# Patient Record
Sex: FEMALE | Race: WHITE | NOT HISPANIC OR LATINO | Employment: UNEMPLOYED | URBAN - METROPOLITAN AREA
[De-identification: names, ages, dates, MRNs, and addresses within clinical notes are randomized per-mention and may not be internally consistent; named-entity substitution may affect disease eponyms.]

---

## 2019-07-08 ENCOUNTER — OFFICE VISIT (OUTPATIENT)
Dept: OBSTETRICS AND GYNECOLOGY | Facility: CLINIC | Age: 49
End: 2019-07-08

## 2019-07-08 ENCOUNTER — LAB (OUTPATIENT)
Dept: LAB | Facility: HOSPITAL | Age: 49
End: 2019-07-08

## 2019-07-08 VITALS
DIASTOLIC BLOOD PRESSURE: 80 MMHG | SYSTOLIC BLOOD PRESSURE: 124 MMHG | HEIGHT: 67 IN | BODY MASS INDEX: 24.01 KG/M2 | WEIGHT: 153 LBS

## 2019-07-08 DIAGNOSIS — N91.1 SECONDARY AMENORRHEA: ICD-10-CM

## 2019-07-08 DIAGNOSIS — Z01.419 ENCOUNTER FOR GYNECOLOGICAL EXAMINATION WITHOUT ABNORMAL FINDING: Primary | ICD-10-CM

## 2019-07-08 LAB — FSH SERPL-ACNC: 134 MIU/ML

## 2019-07-08 PROCEDURE — 36415 COLL VENOUS BLD VENIPUNCTURE: CPT

## 2019-07-08 PROCEDURE — 83001 ASSAY OF GONADOTROPIN (FSH): CPT

## 2019-07-08 PROCEDURE — 99386 PREV VISIT NEW AGE 40-64: CPT | Performed by: OBSTETRICS & GYNECOLOGY

## 2019-07-08 NOTE — PROGRESS NOTES
Chief Complaint   Patient presents with   • Gynecologic Exam     annual exam   • Yue Dinh is a 48 y.o. year old  presenting to be seen for her annual exam.  This patient has been absent from our care for over 3 years.  She has a prior history of a hysteroscopy/laparoscopy for endometriosis.  She has had 3  sections and tubal sterilization.  She has had 3 D&Cs; 2 for incomplete AB's and one for retained products of conception.  I did a hysteroscopy NovaSure endometrial ablation and she has had amenorrhea since that time.  She has some vaginal dryness and difficulty sleeping.  She has symptoms of overactive bladder but does not desire treatment.  She denies bowel symptoms.    SCREENING TESTS  No recent Pap tests or mammograms  Year 2012 2016 2017   Age                         PAP                         HPV high risk                         Mammogram                         GERALD score                         Breast MRI                         Lipids                         Vitamin D                         Colonoscopy                         DEXA  Frax (hip/any)                         Ovarian Screen                             She exercises regularly: yes.  She wears her seat belt: yes.  She has concerns about domestic violence: no.  She has noticed changes in height: no    GYN screening history: None recently  .    No Additional Complaints Reported    The following portions of the patient's history were reviewed and updated as appropriate:vital signs and   She  has a past medical history of OAB (overactive bladder).  She does not have any pertinent problems on file.  She  has a past surgical history that includes Dilation and curettage of uterus; Endometrial ablation; Hysterectomy; Tubal ligation; Hysteroscopy;  section; d&c with suction; and Pelvic laparoscopy.  Her family  "history includes Diabetes in her other; Heart disease in her other; Hypertension in her other; Skin cancer in her other.  She  reports that she has never smoked. She has never used smokeless tobacco. She reports that she does not drink alcohol or use drugs.  Current Outpatient Medications   Medication Sig Dispense Refill   • Cholecalciferol (VITAMIN D PO) Take  by mouth.     • ergocalciferol (ERGOCALCIFEROL) 15971 UNITS capsule Take 1 capsule by mouth 1 (one) time per week.     • vitamin D (ERGOCALCIFEROL) 58542 UNITS capsule capsule Take 1 capsule by mouth 1 (one) time per week.       No current facility-administered medications for this visit.      She is allergic to sulfa antibiotics..    Review of Systems  A comprehensive review of systems was taken.  Constitutional: negative for fever, chills, activity change, appetite change, fatigue and unexpected weight change.  Respiratory: negative  Cardiovascular: negative  Gastrointestinal: negative  Genitourinary:urinary frequency  Musculoskeletal:negative  Behavioral/Psych: negative       /80   Ht 170.2 cm (67\")   Wt 69.4 kg (153 lb)   LMP  (LMP Unknown)   Breastfeeding? No   BMI 23.96 kg/m²     Physical Exam    General:  alert; cooperative; well developed; well nourished   Skin:  No suspicious lesions seen   Thyroid: solitary nodule of right lobe of thyroid   Lungs:  clear to auscultation bilaterally   Heart:  regular rate and rhythm, S1, S2 normal, no murmur, click, rub or gallop   Breasts:  Examined in supine position  Symmetric without masses or skin dimpling  Nipples normal without inversion, lesions or discharge  There are no palpable axillary nodes   Abdomen: soft, non-tender; no masses  no umbilical or inguinal hernias are present  no hepato-splenomegaly   Pelvis: Clinical staff was present for exam  External genitalia:  normal appearance of the external genitalia including Bartholin's and [place]'s glands.  Vaginal:  atrophic mucosal changes are " present;  Cervix:  normal appearance.  Uterus:  normal size, shape and consistency. anteverted;  Adnexa:  non palpable bilaterally.  Rectal:  anus visually normal appearing. recto-vaginal exam unremarkable and confirms findings;     Lab Review   No data reviewed    Imaging  No data reviewed         ASSESSMENT  Problems Addressed this Visit     None      Visit Diagnoses     Encounter for gynecological examination without abnormal finding    -  Primary    Relevant Orders    Liquid-based Pap Smear, Screening    Secondary amenorrhea        Relevant Orders    Follicle Stimulating Hormone          PLAN    · Medications prescribed this encounter:  No orders of the defined types were placed in this encounter.  · Pap test done  · FSH ordered  · Monthly self breast assessment, breast imaging needs to be done  · If the patient is menopausal she will be started on estrogen/progestin therapy  · Calcium, 600 mg/ Vit. D, 400 IU daily; regular weight-bearing exercise  · Follow up: 12 month(s)  *Please note that portions of this documentation may have been completed with a voice recognition program.  Efforts were made to edit this dictation, but occasional words may have been mistranscribed.       This note was electronically signed.    JOEL Amaya MD  July 8, 2019  3:00 PM

## 2019-07-19 ENCOUNTER — HOSPITAL ENCOUNTER (OUTPATIENT)
Dept: MAMMOGRAPHY | Facility: HOSPITAL | Age: 49
Discharge: HOME | End: 2019-07-19
Admitting: OBSTETRICS & GYNECOLOGY

## 2019-07-19 DIAGNOSIS — Z12.39 SCREENING BREAST EXAMINATION: ICD-10-CM

## 2019-07-19 PROCEDURE — 77067 SCR MAMMO BI INCL CAD: CPT

## 2019-07-19 PROCEDURE — 77063 BREAST TOMOSYNTHESIS BI: CPT

## 2019-07-19 PROCEDURE — 77067 SCR MAMMO BI INCL CAD: CPT | Performed by: RADIOLOGY

## 2019-07-19 PROCEDURE — 77063 BREAST TOMOSYNTHESIS BI: CPT | Performed by: RADIOLOGY

## 2021-01-27 ENCOUNTER — IMMUNIZATION (OUTPATIENT)
Dept: VACCINE CLINIC | Facility: HOSPITAL | Age: 51
End: 2021-01-27

## 2021-01-27 PROCEDURE — 0001A: CPT | Performed by: FAMILY MEDICINE

## 2021-01-27 PROCEDURE — 91300 HC SARSCOV02 VAC 30MCG/0.3ML IM: CPT | Performed by: FAMILY MEDICINE

## 2021-02-17 ENCOUNTER — IMMUNIZATION (OUTPATIENT)
Dept: VACCINE CLINIC | Facility: HOSPITAL | Age: 51
End: 2021-02-17

## 2021-02-17 PROCEDURE — 0002A: CPT | Performed by: INTERNAL MEDICINE

## 2021-02-17 PROCEDURE — 91300 HC SARSCOV02 VAC 30MCG/0.3ML IM: CPT | Performed by: INTERNAL MEDICINE

## 2021-04-21 ENCOUNTER — TRANSCRIBE ORDERS (OUTPATIENT)
Dept: ADMINISTRATIVE | Facility: HOSPITAL | Age: 51
End: 2021-04-21

## 2021-04-21 DIAGNOSIS — E04.9 ENLARGEMENT OF THYROID: Primary | ICD-10-CM

## 2021-04-28 ENCOUNTER — HOSPITAL ENCOUNTER (OUTPATIENT)
Dept: ULTRASOUND IMAGING | Facility: HOSPITAL | Age: 51
Discharge: HOME | End: 2021-04-28
Admitting: INTERNAL MEDICINE

## 2021-04-28 DIAGNOSIS — E04.9 ENLARGEMENT OF THYROID: ICD-10-CM

## 2021-04-28 PROCEDURE — 76536 US EXAM OF HEAD AND NECK: CPT | Performed by: RADIOLOGY

## 2021-04-28 PROCEDURE — 76536 US EXAM OF HEAD AND NECK: CPT

## 2021-11-17 ENCOUNTER — APPOINTMENT (OUTPATIENT)
Dept: GENERAL RADIOLOGY | Facility: HOSPITAL | Age: 51
End: 2021-11-17

## 2021-11-17 ENCOUNTER — HOSPITAL ENCOUNTER (EMERGENCY)
Facility: HOSPITAL | Age: 51
Discharge: HOME | End: 2021-11-17
Attending: EMERGENCY MEDICINE | Admitting: EMERGENCY MEDICINE

## 2021-11-17 VITALS
HEIGHT: 67 IN | HEART RATE: 70 BPM | OXYGEN SATURATION: 100 % | BODY MASS INDEX: 25.11 KG/M2 | SYSTOLIC BLOOD PRESSURE: 124 MMHG | RESPIRATION RATE: 16 BRPM | DIASTOLIC BLOOD PRESSURE: 77 MMHG | WEIGHT: 160 LBS | TEMPERATURE: 97.3 F

## 2021-11-17 DIAGNOSIS — R07.9 CHEST PAIN, UNSPECIFIED TYPE: Primary | ICD-10-CM

## 2021-11-17 LAB
ALBUMIN SERPL-MCNC: 4.97 G/DL (ref 3.5–5.2)
ALBUMIN/GLOB SERPL: 1.8 G/DL
ALP SERPL-CCNC: 106 U/L (ref 39–117)
ALT SERPL W P-5'-P-CCNC: 19 U/L (ref 1–33)
ANION GAP SERPL CALCULATED.3IONS-SCNC: 11 MMOL/L (ref 5–15)
AST SERPL-CCNC: 18 U/L (ref 1–32)
BASOPHILS # BLD AUTO: 0.03 10*3/MM3 (ref 0–0.2)
BASOPHILS NFR BLD AUTO: 0.6 % (ref 0–1.5)
BILIRUB SERPL-MCNC: 0.5 MG/DL (ref 0–1.2)
BILIRUB UR QL STRIP: NEGATIVE
BUN SERPL-MCNC: 13 MG/DL (ref 6–20)
BUN/CREAT SERPL: 13.7 (ref 7–25)
CALCIUM SPEC-SCNC: 9.5 MG/DL (ref 8.6–10.5)
CHLORIDE SERPL-SCNC: 105 MMOL/L (ref 98–107)
CLARITY UR: CLEAR
CO2 SERPL-SCNC: 26 MMOL/L (ref 22–29)
COLOR UR: YELLOW
CREAT SERPL-MCNC: 0.95 MG/DL (ref 0.57–1)
D DIMER PPP FEU-MCNC: <0.27 MCGFEU/ML (ref 0–0.5)
DEPRECATED RDW RBC AUTO: 40.9 FL (ref 37–54)
EOSINOPHIL # BLD AUTO: 0.02 10*3/MM3 (ref 0–0.4)
EOSINOPHIL NFR BLD AUTO: 0.4 % (ref 0.3–6.2)
ERYTHROCYTE [DISTWIDTH] IN BLOOD BY AUTOMATED COUNT: 12.8 % (ref 12.3–15.4)
FLUAV SUBTYP SPEC NAA+PROBE: NOT DETECTED
FLUBV RNA ISLT QL NAA+PROBE: NOT DETECTED
GFR SERPL CREATININE-BSD FRML MDRD: 62 ML/MIN/1.73
GLOBULIN UR ELPH-MCNC: 2.7 GM/DL
GLUCOSE SERPL-MCNC: 117 MG/DL (ref 65–99)
GLUCOSE UR STRIP-MCNC: NEGATIVE MG/DL
HCT VFR BLD AUTO: 47.8 % (ref 34–46.6)
HGB BLD-MCNC: 15.2 G/DL (ref 12–15.9)
HGB UR QL STRIP.AUTO: NEGATIVE
HOLD SPECIMEN: NORMAL
HOLD SPECIMEN: NORMAL
IMM GRANULOCYTES # BLD AUTO: 0.01 10*3/MM3 (ref 0–0.05)
IMM GRANULOCYTES NFR BLD AUTO: 0.2 % (ref 0–0.5)
KETONES UR QL STRIP: NEGATIVE
LEUKOCYTE ESTERASE UR QL STRIP.AUTO: NEGATIVE
LYMPHOCYTES # BLD AUTO: 1.06 10*3/MM3 (ref 0.7–3.1)
LYMPHOCYTES NFR BLD AUTO: 20.1 % (ref 19.6–45.3)
MAGNESIUM SERPL-MCNC: 2.3 MG/DL (ref 1.6–2.6)
MCH RBC QN AUTO: 27.8 PG (ref 26.6–33)
MCHC RBC AUTO-ENTMCNC: 31.8 G/DL (ref 31.5–35.7)
MCV RBC AUTO: 87.4 FL (ref 79–97)
MONOCYTES # BLD AUTO: 0.49 10*3/MM3 (ref 0.1–0.9)
MONOCYTES NFR BLD AUTO: 9.3 % (ref 5–12)
NEUTROPHILS NFR BLD AUTO: 3.67 10*3/MM3 (ref 1.7–7)
NEUTROPHILS NFR BLD AUTO: 69.4 % (ref 42.7–76)
NITRITE UR QL STRIP: NEGATIVE
NRBC BLD AUTO-RTO: 0 /100 WBC (ref 0–0.2)
NT-PROBNP SERPL-MCNC: 41.7 PG/ML (ref 0–900)
PH UR STRIP.AUTO: 6.5 [PH] (ref 5–8)
PLATELET # BLD AUTO: 221 10*3/MM3 (ref 140–450)
PMV BLD AUTO: 9.4 FL (ref 6–12)
POTASSIUM SERPL-SCNC: 3.9 MMOL/L (ref 3.5–5.2)
PROT SERPL-MCNC: 7.7 G/DL (ref 6–8.5)
PROT UR QL STRIP: NEGATIVE
QT INTERVAL: 340 MS
QTC INTERVAL: 415 MS
RBC # BLD AUTO: 5.47 10*6/MM3 (ref 3.77–5.28)
SARS-COV-2 RNA PNL SPEC NAA+PROBE: NOT DETECTED
SODIUM SERPL-SCNC: 142 MMOL/L (ref 136–145)
SP GR UR STRIP: 1.01 (ref 1–1.03)
TROPONIN T SERPL-MCNC: <0.01 NG/ML (ref 0–0.03)
TROPONIN T SERPL-MCNC: <0.01 NG/ML (ref 0–0.03)
TSH SERPL DL<=0.05 MIU/L-ACNC: 2.93 UIU/ML (ref 0.27–4.2)
UROBILINOGEN UR QL STRIP: NORMAL
WBC NRBC COR # BLD: 5.28 10*3/MM3 (ref 3.4–10.8)
WHOLE BLOOD HOLD SPECIMEN: NORMAL
WHOLE BLOOD HOLD SPECIMEN: NORMAL

## 2021-11-17 PROCEDURE — 81003 URINALYSIS AUTO W/O SCOPE: CPT | Performed by: PHYSICIAN ASSISTANT

## 2021-11-17 PROCEDURE — 80053 COMPREHEN METABOLIC PANEL: CPT | Performed by: PHYSICIAN ASSISTANT

## 2021-11-17 PROCEDURE — 71045 X-RAY EXAM CHEST 1 VIEW: CPT | Performed by: RADIOLOGY

## 2021-11-17 PROCEDURE — 84484 ASSAY OF TROPONIN QUANT: CPT | Performed by: PHYSICIAN ASSISTANT

## 2021-11-17 PROCEDURE — 84443 ASSAY THYROID STIM HORMONE: CPT | Performed by: PHYSICIAN ASSISTANT

## 2021-11-17 PROCEDURE — 87636 SARSCOV2 & INF A&B AMP PRB: CPT | Performed by: PHYSICIAN ASSISTANT

## 2021-11-17 PROCEDURE — 93010 ELECTROCARDIOGRAM REPORT: CPT | Performed by: INTERNAL MEDICINE

## 2021-11-17 PROCEDURE — 93005 ELECTROCARDIOGRAM TRACING: CPT | Performed by: PHYSICIAN ASSISTANT

## 2021-11-17 PROCEDURE — 36415 COLL VENOUS BLD VENIPUNCTURE: CPT

## 2021-11-17 PROCEDURE — 99284 EMERGENCY DEPT VISIT MOD MDM: CPT

## 2021-11-17 PROCEDURE — 85379 FIBRIN DEGRADATION QUANT: CPT | Performed by: PHYSICIAN ASSISTANT

## 2021-11-17 PROCEDURE — 71045 X-RAY EXAM CHEST 1 VIEW: CPT

## 2021-11-17 PROCEDURE — 83880 ASSAY OF NATRIURETIC PEPTIDE: CPT | Performed by: PHYSICIAN ASSISTANT

## 2021-11-17 PROCEDURE — 85025 COMPLETE CBC W/AUTO DIFF WBC: CPT | Performed by: PHYSICIAN ASSISTANT

## 2021-11-17 PROCEDURE — 83735 ASSAY OF MAGNESIUM: CPT | Performed by: PHYSICIAN ASSISTANT

## 2021-11-17 RX ORDER — ASPIRIN 81 MG/1
324 TABLET, CHEWABLE ORAL ONCE
Status: COMPLETED | OUTPATIENT
Start: 2021-11-17 | End: 2021-11-17

## 2021-11-17 RX ORDER — SODIUM CHLORIDE 0.9 % (FLUSH) 0.9 %
10 SYRINGE (ML) INJECTION AS NEEDED
Status: DISCONTINUED | OUTPATIENT
Start: 2021-11-17 | End: 2021-11-17 | Stop reason: HOSPADM

## 2021-11-17 RX ADMIN — ASPIRIN 324 MG: 81 TABLET, CHEWABLE ORAL at 13:19

## 2021-11-17 NOTE — ED NOTES
Provided pt with urine cup to attempt urine sample. Pt states she can't provide one at this time, but will let us know when she can.      Irma Hand  11/17/21

## 2021-11-17 NOTE — ED PROVIDER NOTES
Subjective   50 yo female patient presents to the ED with complaints of chest pain. Patient states that she has had mid sternal chest pain since Thursday. She states she was seen by her PCP today and had abnormal EKG in the office and was sent to the ED for evaluation. Pt states that she has no cardiac hx and takes no daily medications. She states that this is a dull ache that has been spontaneous for the past week.  No worsening or alleviating factors.        History provided by:  Patient   used: No        Review of Systems   Constitutional: Negative.    HENT: Negative.    Eyes: Negative.    Respiratory: Negative.    Cardiovascular: Positive for chest pain.   Gastrointestinal: Negative.    Endocrine: Negative.    Genitourinary: Negative.    Musculoskeletal: Negative.    Skin: Negative.    Allergic/Immunologic: Negative.    Neurological: Negative.    Hematological: Negative.    Psychiatric/Behavioral: Negative.    All other systems reviewed and are negative.      Past Medical History:   Diagnosis Date   • OAB (overactive bladder)        Allergies   Allergen Reactions   • Sulfa Antibiotics        Past Surgical History:   Procedure Laterality Date   •  SECTION      x 3   • D&C WITH SUCTION     • DILATATION AND CURETTAGE     • ENDOMETRIAL ABLATION      gynecologic services thermal endometrial ablation   • HYSTEROSCOPY      NovaSure ablation   • PELVIC LAPAROSCOPY     • TUBAL ABDOMINAL LIGATION         Family History   Problem Relation Age of Onset   • Diabetes Other    • Heart disease Other         congenital   • Hypertension Other    • Skin cancer Other    • Breast cancer Neg Hx        Social History     Socioeconomic History   • Marital status:    Tobacco Use   • Smoking status: Never Smoker   • Smokeless tobacco: Never Used   Substance and Sexual Activity   • Alcohol use: No   • Drug use: No   • Sexual activity: Yes     Partners: Male     Birth control/protection: Surgical            Objective   Physical Exam  Vitals and nursing note reviewed.   Constitutional:       Appearance: She is well-developed and normal weight.   HENT:      Head: Normocephalic and atraumatic.   Eyes:      Extraocular Movements: Extraocular movements intact.      Pupils: Pupils are equal, round, and reactive to light.   Cardiovascular:      Rate and Rhythm: Normal rate and regular rhythm.      Heart sounds: Normal heart sounds.   Pulmonary:      Effort: Pulmonary effort is normal.      Breath sounds: Normal breath sounds.   Abdominal:      General: Bowel sounds are normal.      Palpations: Abdomen is soft.   Musculoskeletal:         General: Normal range of motion.      Cervical back: Normal range of motion and neck supple.   Skin:     General: Skin is warm and dry.      Capillary Refill: Capillary refill takes less than 2 seconds.   Neurological:      General: No focal deficit present.      Mental Status: She is alert and oriented to person, place, and time.   Psychiatric:         Mood and Affect: Mood normal.         Behavior: Behavior normal.         Procedures           ED Course  ED Course as of 11/17/21 1626   Wed Nov 17, 2021   1419 XR Chest 1 View  IMPRESSION:    Unremarkable exam. No acute cardiopulmonary findings identified.     This report was finalized on 11/17/2021 2:11 PM by Dr. Kwesi Ayala MD.             Specimen Collected: 11/17/21 14:11 Last Resulted: 11/17/21 14:11         [ML]   1505 ECG 12 Lead  Normal sinus rhythm  Possible Left atrial enlargement  Borderline ECG  When compared with ECG of 17-NOV-2021 12:29,  premature atrial complexes are no longer present  Vent. rate 67 BPM  AK interval 146 ms  QRS duration 72 ms  QT/QTcB 380/401 ms  P-R-T axes 72 37 49 [ES]      ED Course User Index  [ES] Harvey Piedra MD  [ML] Melinda Jorge PA                                         HEART Score (for prediction of 6-week risk of major adverse cardiac event) reviewed and/or  performed as part of the patient evaluation and treatment planning process.  The result associated with this review/performance is: 2       MDM  Number of Diagnoses or Management Options     Amount and/or Complexity of Data Reviewed  Clinical lab tests: reviewed and ordered  Tests in the radiology section of CPT®: reviewed and ordered  Tests in the medicine section of CPT®: reviewed    Risk of Complications, Morbidity, and/or Mortality  Presenting problems: low  Diagnostic procedures: low  Management options: low    Patient Progress  Patient progress: improved      Final diagnoses:   Chest pain, unspecified type       ED Disposition  ED Disposition     ED Disposition Condition Comment    Discharge Stable           Nilda Zamorano MD  [address] [place] [address]  [id number]  [facility] [id number] [phone]    Schedule an appointment as soon as possible for a visit in 1 day           Medication List      No changes were made to your prescriptions during this visit.          Melinda Jorge PA  11/17/21 [id number]

## 2021-11-18 ENCOUNTER — HOSPITAL ENCOUNTER (OUTPATIENT)
Dept: RESPIRATORY THERAPY | Facility: HOSPITAL | Age: 51
Discharge: HOME | End: 2021-11-18
Admitting: INTERNAL MEDICINE

## 2021-11-18 ENCOUNTER — TRANSCRIBE ORDERS (OUTPATIENT)
Dept: ADMINISTRATIVE | Facility: HOSPITAL | Age: 51
End: 2021-11-18

## 2021-11-18 DIAGNOSIS — E04.1 NONTOXIC UNINODULAR GOITER: Primary | ICD-10-CM

## 2021-11-18 DIAGNOSIS — R07.89 OTHER CHEST PAIN: ICD-10-CM

## 2021-11-18 DIAGNOSIS — R07.89 OTHER CHEST PAIN: Primary | ICD-10-CM

## 2021-11-18 LAB
QT INTERVAL: 380 MS
QTC INTERVAL: 401 MS

## 2021-11-18 PROCEDURE — 93225 XTRNL ECG REC<48 HRS REC: CPT

## 2021-11-19 ENCOUNTER — TRANSCRIBE ORDERS (OUTPATIENT)
Dept: ADMINISTRATIVE | Facility: HOSPITAL | Age: 51
End: 2021-11-19

## 2021-11-19 DIAGNOSIS — R07.9 CHEST PAIN, UNSPECIFIED TYPE: Primary | ICD-10-CM

## 2021-11-22 ENCOUNTER — TRANSCRIBE ORDERS (OUTPATIENT)
Dept: ADMINISTRATIVE | Facility: HOSPITAL | Age: 51
End: 2021-11-22

## 2021-11-22 DIAGNOSIS — R07.89 OTHER CHEST PAIN: Primary | ICD-10-CM

## 2021-11-23 ENCOUNTER — HOSPITAL ENCOUNTER (OUTPATIENT)
Dept: CARDIOLOGY | Facility: HOSPITAL | Age: 51
End: 2021-11-23

## 2021-11-23 ENCOUNTER — HOSPITAL ENCOUNTER (OUTPATIENT)
Dept: ULTRASOUND IMAGING | Facility: HOSPITAL | Age: 51
Discharge: HOME | End: 2021-11-23
Admitting: INTERNAL MEDICINE

## 2021-11-23 DIAGNOSIS — E04.1 NONTOXIC UNINODULAR GOITER: ICD-10-CM

## 2021-11-23 PROCEDURE — 76536 US EXAM OF HEAD AND NECK: CPT | Performed by: RADIOLOGY

## 2021-11-23 PROCEDURE — 76536 US EXAM OF HEAD AND NECK: CPT

## 2021-11-24 ENCOUNTER — HOSPITAL ENCOUNTER (OUTPATIENT)
Dept: NUCLEAR MEDICINE | Facility: HOSPITAL | Age: 51
Discharge: HOME | End: 2021-11-24

## 2021-11-24 ENCOUNTER — HOSPITAL ENCOUNTER (OUTPATIENT)
Dept: CARDIOLOGY | Facility: HOSPITAL | Age: 51
Discharge: HOME | End: 2021-11-24

## 2021-11-24 ENCOUNTER — TRANSCRIBE ORDERS (OUTPATIENT)
Dept: ADMINISTRATIVE | Facility: HOSPITAL | Age: 51
End: 2021-11-24

## 2021-11-24 DIAGNOSIS — R07.89 OTHER CHEST PAIN: ICD-10-CM

## 2021-11-24 DIAGNOSIS — R07.89 OTHER CHEST PAIN: Primary | ICD-10-CM

## 2021-11-24 PROCEDURE — 93306 TTE W/DOPPLER COMPLETE: CPT | Performed by: INTERNAL MEDICINE

## 2021-11-24 PROCEDURE — 93018 CV STRESS TEST I&R ONLY: CPT | Performed by: INTERNAL MEDICINE

## 2021-11-24 PROCEDURE — A9500 TC99M SESTAMIBI: HCPCS | Performed by: INTERNAL MEDICINE

## 2021-11-24 PROCEDURE — 93306 TTE W/DOPPLER COMPLETE: CPT

## 2021-11-24 PROCEDURE — 78452 HT MUSCLE IMAGE SPECT MULT: CPT

## 2021-11-24 PROCEDURE — 78452 HT MUSCLE IMAGE SPECT MULT: CPT | Performed by: INTERNAL MEDICINE

## 2021-11-24 PROCEDURE — 93017 CV STRESS TEST TRACING ONLY: CPT

## 2021-11-24 PROCEDURE — 0 TECHNETIUM SESTAMIBI: Performed by: INTERNAL MEDICINE

## 2021-11-24 RX ADMIN — TECHNETIUM TC 99M SESTAMIBI 1 DOSE: 1 INJECTION INTRAVENOUS at 12:55

## 2021-11-24 RX ADMIN — TECHNETIUM TC 99M SESTAMIBI 1 DOSE: 1 INJECTION INTRAVENOUS at 11:35

## 2021-11-28 LAB
BH CV ECHO MEAS - AO ROOT AREA (BSA CORRECTED): 1.3
BH CV ECHO MEAS - AO ROOT AREA: 4.5 CM^2
BH CV ECHO MEAS - AO ROOT DIAM: 2.4 CM
BH CV ECHO MEAS - BSA(HAYCOCK): 1.9 M^2
BH CV ECHO MEAS - BSA: 1.8 M^2
BH CV ECHO MEAS - BZI_BMI: 25.1 KILOGRAMS/M^2
BH CV ECHO MEAS - BZI_METRIC_HEIGHT: 170.2 CM
BH CV ECHO MEAS - BZI_METRIC_WEIGHT: 72.6 KG
BH CV ECHO MEAS - EDV(CUBED): 79.5 ML
BH CV ECHO MEAS - EDV(MOD-SP4): 46.5 ML
BH CV ECHO MEAS - EDV(TEICH): 83.1 ML
BH CV ECHO MEAS - EF(CUBED): 58.8 %
BH CV ECHO MEAS - EF(MOD-SP4): 69.7 %
BH CV ECHO MEAS - EF(TEICH): 50.7 %
BH CV ECHO MEAS - ESV(CUBED): 32.8 ML
BH CV ECHO MEAS - ESV(MOD-SP4): 14.1 ML
BH CV ECHO MEAS - ESV(TEICH): 41 ML
BH CV ECHO MEAS - FS: 25.6 %
BH CV ECHO MEAS - IVS/LVPW: 0.8
BH CV ECHO MEAS - IVSD: 0.8 CM
BH CV ECHO MEAS - LA DIMENSION: 2 CM
BH CV ECHO MEAS - LA/AO: 0.83
BH CV ECHO MEAS - LV DIASTOLIC VOL/BSA (35-75): 25.3 ML/M^2
BH CV ECHO MEAS - LV MASS(C)D: 123.3 GRAMS
BH CV ECHO MEAS - LV MASS(C)DI: 67 GRAMS/M^2
BH CV ECHO MEAS - LV SYSTOLIC VOL/BSA (12-30): 7.7 ML/M^2
BH CV ECHO MEAS - LVIDD: 4.3 CM
BH CV ECHO MEAS - LVIDS: 3.2 CM
BH CV ECHO MEAS - LVLD AP4: 6.3 CM
BH CV ECHO MEAS - LVLS AP4: 5.5 CM
BH CV ECHO MEAS - LVOT AREA (M): 1.3 CM^2
BH CV ECHO MEAS - LVOT AREA: 1.3 CM^2
BH CV ECHO MEAS - LVOT DIAM: 1.3 CM
BH CV ECHO MEAS - LVPWD: 1 CM
BH CV ECHO MEAS - MV A MAX VEL: 86.9 CM/SEC
BH CV ECHO MEAS - MV E MAX VEL: 141 CM/SEC
BH CV ECHO MEAS - MV E/A: 1.6
BH CV ECHO MEAS - PA ACC TIME: 0.1 SEC
BH CV ECHO MEAS - PA PR(ACCEL): 34.5 MMHG
BH CV ECHO MEAS - RAP SYSTOLE: 10 MMHG
BH CV ECHO MEAS - RVSP: 39.2 MMHG
BH CV ECHO MEAS - SI(CUBED): 25.4 ML/M^2
BH CV ECHO MEAS - SI(MOD-SP4): 17.6 ML/M^2
BH CV ECHO MEAS - SI(TEICH): 22.9 ML/M^2
BH CV ECHO MEAS - SV(CUBED): 46.7 ML
BH CV ECHO MEAS - SV(MOD-SP4): 32.4 ML
BH CV ECHO MEAS - SV(TEICH): 42.1 ML
BH CV ECHO MEAS - TR MAX VEL: 270 CM/SEC
BH CV NUCLEAR PRIOR STUDY: 3
BH CV REST NUCLEAR ISOTOPE DOSE: 10.3 MCI
BH CV STRESS BP STAGE 1: NORMAL
BH CV STRESS BP STAGE 2: NORMAL
BH CV STRESS DURATION MIN STAGE 1: 3
BH CV STRESS DURATION MIN STAGE 2: 3
BH CV STRESS DURATION SEC STAGE 1: 0
BH CV STRESS DURATION SEC STAGE 2: 0
BH CV STRESS GRADE STAGE 1: 10
BH CV STRESS GRADE STAGE 2: 12
BH CV STRESS HR STAGE 1: 126
BH CV STRESS HR STAGE 2: 160
BH CV STRESS METS STAGE 1: 5
BH CV STRESS METS STAGE 2: 7.5
BH CV STRESS NUCLEAR ISOTOPE DOSE: 30.4 MCI
BH CV STRESS PROTOCOL 1: NORMAL
BH CV STRESS RECOVERY BP: NORMAL MMHG
BH CV STRESS RECOVERY HR: 96 BPM
BH CV STRESS SPEED STAGE 1: 1.7
BH CV STRESS SPEED STAGE 2: 2.5
BH CV STRESS STAGE 1: 1
BH CV STRESS STAGE 2: 2
MAXIMAL PREDICTED HEART RATE: 169 BPM
MAXIMAL PREDICTED HEART RATE: 169 BPM
PERCENT MAX PREDICTED HR: 94.67 %
STRESS BASELINE BP: NORMAL MMHG
STRESS BASELINE HR: 86 BPM
STRESS PERCENT HR: 111 %
STRESS POST ESTIMATED WORKLOAD: 7 METS
STRESS POST EXERCISE DUR MIN: 6 MIN
STRESS POST PEAK BP: NORMAL MMHG
STRESS POST PEAK HR: 160 BPM
STRESS TARGET HR: 144 BPM
STRESS TARGET HR: 144 BPM

## 2021-11-29 ENCOUNTER — OFFICE VISIT (OUTPATIENT)
Dept: CARDIOLOGY | Facility: CLINIC | Age: 51
End: 2021-11-29

## 2021-11-29 VITALS
DIASTOLIC BLOOD PRESSURE: 75 MMHG | BODY MASS INDEX: 25.39 KG/M2 | HEIGHT: 67 IN | RESPIRATION RATE: 16 BRPM | HEART RATE: 77 BPM | WEIGHT: 161.8 LBS | SYSTOLIC BLOOD PRESSURE: 136 MMHG | TEMPERATURE: 97.1 F

## 2021-11-29 DIAGNOSIS — R07.9 CHEST PAIN, UNSPECIFIED TYPE: Primary | ICD-10-CM

## 2021-11-29 DIAGNOSIS — I47.1 PAROXYSMAL SVT (SUPRAVENTRICULAR TACHYCARDIA) (HCC): ICD-10-CM

## 2021-11-29 PROCEDURE — 93000 ELECTROCARDIOGRAM COMPLETE: CPT | Performed by: INTERNAL MEDICINE

## 2021-11-29 PROCEDURE — 99204 OFFICE O/P NEW MOD 45 MIN: CPT | Performed by: INTERNAL MEDICINE

## 2021-12-03 ENCOUNTER — PATIENT ROUNDING (BHMG ONLY) (OUTPATIENT)
Dept: CARDIOLOGY | Facility: CLINIC | Age: 51
End: 2021-12-03

## 2021-12-03 NOTE — PROGRESS NOTES
Kitty    My name is Danii    I am  with [facility]  [facility]OLOG[facility]  [address] [place] [address] [phone].    I am calling to officially welcome you to our practice and ask about your visit. If there is anything at all we can do for you please let us know. We're always looking for ways to make our patients' experiences even better. If you have any recommendations on ways we may improve please don't hesitate to call.     Thank you so much and have a marvelous day.

## 2022-05-12 ENCOUNTER — TELEPHONE (OUTPATIENT)
Dept: GASTROENTEROLOGY | Facility: CLINIC | Age: 52
End: 2022-05-12

## 2022-05-16 NOTE — TELEPHONE ENCOUNTER
OK. I tried to call patient x2 and could not reach her. I will let her know she has to be seen first. I apologize I didn't realize she had not been seen yet

## 2022-07-14 ENCOUNTER — OFFICE VISIT (OUTPATIENT)
Dept: SURGERY | Facility: CLINIC | Age: 52
End: 2022-07-14

## 2022-07-14 VITALS
WEIGHT: 159.8 LBS | HEART RATE: 87 BPM | BODY MASS INDEX: 25.08 KG/M2 | SYSTOLIC BLOOD PRESSURE: 146 MMHG | DIASTOLIC BLOOD PRESSURE: 82 MMHG | HEIGHT: 67 IN

## 2022-07-14 DIAGNOSIS — K62.89 RECTAL PAIN: Primary | ICD-10-CM

## 2022-07-14 PROCEDURE — 46600 DIAGNOSTIC ANOSCOPY SPX: CPT | Performed by: SURGERY

## 2022-07-14 PROCEDURE — 99203 OFFICE O/P NEW LOW 30 MIN: CPT | Performed by: SURGERY

## 2022-07-14 NOTE — PROGRESS NOTES
Subjective   Marichuy Dinh is a 51 y.o. female here today for rectal pain.    History of Present Illness  Ms. Dinh was seen in the office today for rectal pain.  She states that this has been going on since January.  She feels a pressure which is present basically all the time.  Does not appear to be related to her bowel movements although she states that after seen her primary doctor recently after she did a rectal patient had a large bowel movement and did feel that she had some relief.  She otherwise states that she has bowel movements on a daily basis.  She does not appreciate any palpable mass.  She does state that the area sometimes is red.  When asked whether the area of concern was actually at the anus or the coccyx the patient was not clear.  There is no prior history of rectal fistula or pilonidal cyst.  Patient does state that she has had 2 colonoscopies in the past which were unremarkable.  Patient states that when she travels in the car any distance, even to [place] that she uses a pillow to help with the discomfort.  Allergies   Allergen Reactions   • Sulfa Antibiotics      Current Outpatient Medications   Medication Sig Dispense Refill   • Cholecalciferol (VITAMIN D PO) Take  by mouth.     • ergocalciferol (ERGOCALCIFEROL) 60754 UNITS capsule Take 1 capsule by mouth 1 (one) time per week.     • famotidine (PEPCID) 20 MG tablet Take 1 tablet by mouth 2 (Two) Times a Day. 60 tablet 2   • Lidocaine-Hydrocort, Perianal, 3-0.5 % cream rectal cream Apply to affected area 2 times daily as needed. 85 g 2   • vitamin D (ERGOCALCIFEROL) 30578 UNITS capsule capsule Take 1 capsule by mouth 1 (one) time per week.       No current facility-administered medications for this visit.     Past Medical History:   Diagnosis Date   • OAB (overactive bladder)    • Thyroid cyst      Past Surgical History:   Procedure Laterality Date   •  SECTION      x 3   • D & C WITH SUCTION     • DILATATION AND CURETTAGE     •  "ENDOMETRIAL ABLATION      gynecologic services thermal endometrial ablation   • HYSTEROSCOPY      NovaSure ablation   • PELVIC LAPAROSCOPY     • TUBAL ABDOMINAL LIGATION         Pertinent Review of Systems:  Respiratory: no shortness of breath  Cardiovascular: no chest pain  Other pertinent:      Objective   /82 (BP Location: Left arm)   Pulse 87   Ht 170.2 cm (67\")   Wt 72.5 kg (159 lb 12.8 oz)   BMI 25.03 kg/m²   Physical Exam  Well-developed well-nourished female no acute distress  Genitalia: Unremarkable.  External perirectal examination unremarkable with the exception of a very small nontender skin tag at the anus.  Digital rectal examination demonstrates some thinness of the rectovaginal wall but no lesions identified.  No tenderness.  Anoscopy demonstrates some internal hemorrhoids which are nontender and not bleeding.  Procedures     Results/Data:  Imaging:   Notes:   Lab:   Other:     Assessment & Plan   Rectal pain with no identifiable etiology by history or examination.    Patient has scheduled consultation with Dr. Busch from GI and advised her to keep that appointment  Follow-up with me as needed         Discussion/Summary    Time spent:     BMI is >= 25 and <30. (Overweight) The following options were offered after discussion;: exercise counseling/recommendations       Future Appointments   Date Time Provider Department Center   7/28/2022  1:30 PM Alicia Doll MD [facility]         Please note that portions of this note were completed with a voice recognition program.  "

## 2022-09-19 ENCOUNTER — APPOINTMENT (OUTPATIENT)
Dept: GENERAL RADIOLOGY | Facility: HOSPITAL | Age: 52
End: 2022-09-19

## 2022-09-19 ENCOUNTER — APPOINTMENT (OUTPATIENT)
Dept: CT IMAGING | Facility: HOSPITAL | Age: 52
End: 2022-09-19

## 2022-09-19 ENCOUNTER — HOSPITAL ENCOUNTER (EMERGENCY)
Facility: HOSPITAL | Age: 52
Discharge: HOME | End: 2022-09-19
Attending: EMERGENCY MEDICINE | Admitting: EMERGENCY MEDICINE

## 2022-09-19 VITALS
DIASTOLIC BLOOD PRESSURE: 82 MMHG | OXYGEN SATURATION: 100 % | WEIGHT: 160 LBS | TEMPERATURE: 98.4 F | HEART RATE: 82 BPM | BODY MASS INDEX: 25.11 KG/M2 | RESPIRATION RATE: 18 BRPM | HEIGHT: 67 IN | SYSTOLIC BLOOD PRESSURE: 105 MMHG

## 2022-09-19 DIAGNOSIS — S62.101A CLOSED FRACTURE OF BOTH WRISTS, INITIAL ENCOUNTER: Primary | ICD-10-CM

## 2022-09-19 DIAGNOSIS — S62.102A CLOSED FRACTURE OF BOTH WRISTS, INITIAL ENCOUNTER: Primary | ICD-10-CM

## 2022-09-19 DIAGNOSIS — S22.32XA CLOSED FRACTURE OF ONE RIB OF LEFT SIDE, INITIAL ENCOUNTER: ICD-10-CM

## 2022-09-19 LAB
ALBUMIN SERPL-MCNC: 4.6 G/DL (ref 3.5–5.2)
ALBUMIN/GLOB SERPL: 1.8 G/DL
ALP SERPL-CCNC: 106 U/L (ref 39–117)
ALT SERPL W P-5'-P-CCNC: 21 U/L (ref 1–33)
ANION GAP SERPL CALCULATED.3IONS-SCNC: 12.1 MMOL/L (ref 5–15)
AST SERPL-CCNC: 21 U/L (ref 1–32)
BASOPHILS # BLD AUTO: 0.02 10*3/MM3 (ref 0–0.2)
BASOPHILS NFR BLD AUTO: 0.4 % (ref 0–1.5)
BILIRUB SERPL-MCNC: 0.6 MG/DL (ref 0–1.2)
BUN SERPL-MCNC: 10 MG/DL (ref 6–20)
BUN/CREAT SERPL: 11.8 (ref 7–25)
CALCIUM SPEC-SCNC: 9.8 MG/DL (ref 8.6–10.5)
CHLORIDE SERPL-SCNC: 105 MMOL/L (ref 98–107)
CO2 SERPL-SCNC: 24.9 MMOL/L (ref 22–29)
CREAT SERPL-MCNC: 0.85 MG/DL (ref 0.57–1)
DEPRECATED RDW RBC AUTO: 41.9 FL (ref 37–54)
EGFRCR SERPLBLD CKD-EPI 2021: 83.1 ML/MIN/1.73
EOSINOPHIL # BLD AUTO: 0.06 10*3/MM3 (ref 0–0.4)
EOSINOPHIL NFR BLD AUTO: 1.1 % (ref 0.3–6.2)
ERYTHROCYTE [DISTWIDTH] IN BLOOD BY AUTOMATED COUNT: 13.1 % (ref 12.3–15.4)
GLOBULIN UR ELPH-MCNC: 2.5 GM/DL
GLUCOSE SERPL-MCNC: 112 MG/DL (ref 65–99)
HCT VFR BLD AUTO: 43.7 % (ref 34–46.6)
HGB BLD-MCNC: 14.2 G/DL (ref 12–15.9)
HOLD SPECIMEN: NORMAL
HOLD SPECIMEN: NORMAL
IMM GRANULOCYTES # BLD AUTO: 0.04 10*3/MM3 (ref 0–0.05)
IMM GRANULOCYTES NFR BLD AUTO: 0.8 % (ref 0–0.5)
LYMPHOCYTES # BLD AUTO: 1.41 10*3/MM3 (ref 0.7–3.1)
LYMPHOCYTES NFR BLD AUTO: 26.6 % (ref 19.6–45.3)
MCH RBC QN AUTO: 28.6 PG (ref 26.6–33)
MCHC RBC AUTO-ENTMCNC: 32.5 G/DL (ref 31.5–35.7)
MCV RBC AUTO: 87.9 FL (ref 79–97)
MONOCYTES # BLD AUTO: 0.39 10*3/MM3 (ref 0.1–0.9)
MONOCYTES NFR BLD AUTO: 7.3 % (ref 5–12)
NEUTROPHILS NFR BLD AUTO: 3.39 10*3/MM3 (ref 1.7–7)
NEUTROPHILS NFR BLD AUTO: 63.8 % (ref 42.7–76)
NRBC BLD AUTO-RTO: 0 /100 WBC (ref 0–0.2)
PLATELET # BLD AUTO: 207 10*3/MM3 (ref 140–450)
PMV BLD AUTO: 9.9 FL (ref 6–12)
POTASSIUM SERPL-SCNC: 4 MMOL/L (ref 3.5–5.2)
PROT SERPL-MCNC: 7.1 G/DL (ref 6–8.5)
RBC # BLD AUTO: 4.97 10*6/MM3 (ref 3.77–5.28)
SODIUM SERPL-SCNC: 142 MMOL/L (ref 136–145)
TROPONIN T SERPL-MCNC: <0.01 NG/ML (ref 0–0.03)
WBC NRBC COR # BLD: 5.31 10*3/MM3 (ref 3.4–10.8)
WHOLE BLOOD HOLD COAG: NORMAL
WHOLE BLOOD HOLD SPECIMEN: NORMAL

## 2022-09-19 PROCEDURE — 99284 EMERGENCY DEPT VISIT MOD MDM: CPT

## 2022-09-19 PROCEDURE — 72170 X-RAY EXAM OF PELVIS: CPT

## 2022-09-19 PROCEDURE — 25010000002 TETANUS-DIPHTH-ACELL PERTUSSIS 5-2.5-18.5 LF-MCG/0.5 SUSPENSION PREFILLED SYRINGE: Performed by: EMERGENCY MEDICINE

## 2022-09-19 PROCEDURE — 71100 X-RAY EXAM RIBS UNI 2 VIEWS: CPT

## 2022-09-19 PROCEDURE — 71045 X-RAY EXAM CHEST 1 VIEW: CPT | Performed by: RADIOLOGY

## 2022-09-19 PROCEDURE — 96375 TX/PRO/DX INJ NEW DRUG ADDON: CPT

## 2022-09-19 PROCEDURE — 80053 COMPREHEN METABOLIC PANEL: CPT | Performed by: EMERGENCY MEDICINE

## 2022-09-19 PROCEDURE — 96376 TX/PRO/DX INJ SAME DRUG ADON: CPT

## 2022-09-19 PROCEDURE — 72128 CT CHEST SPINE W/O DYE: CPT

## 2022-09-19 PROCEDURE — 73090 X-RAY EXAM OF FOREARM: CPT | Performed by: RADIOLOGY

## 2022-09-19 PROCEDURE — 70486 CT MAXILLOFACIAL W/O DYE: CPT | Performed by: RADIOLOGY

## 2022-09-19 PROCEDURE — 71100 X-RAY EXAM RIBS UNI 2 VIEWS: CPT | Performed by: RADIOLOGY

## 2022-09-19 PROCEDURE — 85025 COMPLETE CBC W/AUTO DIFF WBC: CPT | Performed by: EMERGENCY MEDICINE

## 2022-09-19 PROCEDURE — 36415 COLL VENOUS BLD VENIPUNCTURE: CPT

## 2022-09-19 PROCEDURE — 73110 X-RAY EXAM OF WRIST: CPT | Performed by: RADIOLOGY

## 2022-09-19 PROCEDURE — 70450 CT HEAD/BRAIN W/O DYE: CPT

## 2022-09-19 PROCEDURE — 71045 X-RAY EXAM CHEST 1 VIEW: CPT

## 2022-09-19 PROCEDURE — 70450 CT HEAD/BRAIN W/O DYE: CPT | Performed by: RADIOLOGY

## 2022-09-19 PROCEDURE — 73110 X-RAY EXAM OF WRIST: CPT

## 2022-09-19 PROCEDURE — 73090 X-RAY EXAM OF FOREARM: CPT

## 2022-09-19 PROCEDURE — 72131 CT LUMBAR SPINE W/O DYE: CPT | Performed by: RADIOLOGY

## 2022-09-19 PROCEDURE — 93010 ELECTROCARDIOGRAM REPORT: CPT | Performed by: INTERNAL MEDICINE

## 2022-09-19 PROCEDURE — 25010000002 MORPHINE PER 10 MG: Performed by: EMERGENCY MEDICINE

## 2022-09-19 PROCEDURE — 72125 CT NECK SPINE W/O DYE: CPT | Performed by: RADIOLOGY

## 2022-09-19 PROCEDURE — 72128 CT CHEST SPINE W/O DYE: CPT | Performed by: RADIOLOGY

## 2022-09-19 PROCEDURE — 96374 THER/PROPH/DIAG INJ IV PUSH: CPT

## 2022-09-19 PROCEDURE — 72131 CT LUMBAR SPINE W/O DYE: CPT

## 2022-09-19 PROCEDURE — 93005 ELECTROCARDIOGRAM TRACING: CPT | Performed by: EMERGENCY MEDICINE

## 2022-09-19 PROCEDURE — 25010000002 PROCHLORPERAZINE 10 MG/2ML SOLUTION: Performed by: EMERGENCY MEDICINE

## 2022-09-19 PROCEDURE — 90471 IMMUNIZATION ADMIN: CPT | Performed by: EMERGENCY MEDICINE

## 2022-09-19 PROCEDURE — 72125 CT NECK SPINE W/O DYE: CPT

## 2022-09-19 PROCEDURE — 96361 HYDRATE IV INFUSION ADD-ON: CPT

## 2022-09-19 PROCEDURE — 72170 X-RAY EXAM OF PELVIS: CPT | Performed by: RADIOLOGY

## 2022-09-19 PROCEDURE — 70486 CT MAXILLOFACIAL W/O DYE: CPT

## 2022-09-19 PROCEDURE — 90715 TDAP VACCINE 7 YRS/> IM: CPT | Performed by: EMERGENCY MEDICINE

## 2022-09-19 PROCEDURE — 84484 ASSAY OF TROPONIN QUANT: CPT | Performed by: EMERGENCY MEDICINE

## 2022-09-19 RX ORDER — SODIUM CHLORIDE 9 MG/ML
125 INJECTION, SOLUTION INTRAVENOUS CONTINUOUS
Status: DISCONTINUED | OUTPATIENT
Start: 2022-09-19 | End: 2022-09-19 | Stop reason: HOSPADM

## 2022-09-19 RX ORDER — OXYCODONE HYDROCHLORIDE AND ACETAMINOPHEN 5; 325 MG/1; MG/1
1 TABLET ORAL EVERY 6 HOURS PRN
Qty: 12 TABLET | Refills: 0 | Status: SHIPPED | OUTPATIENT
Start: 2022-09-19 | End: 2022-09-23 | Stop reason: HOSPADM

## 2022-09-19 RX ORDER — MORPHINE SULFATE 2 MG/ML
2 INJECTION, SOLUTION INTRAMUSCULAR; INTRAVENOUS ONCE
Status: COMPLETED | OUTPATIENT
Start: 2022-09-19 | End: 2022-09-19

## 2022-09-19 RX ORDER — SODIUM CHLORIDE 0.9 % (FLUSH) 0.9 %
10 SYRINGE (ML) INJECTION AS NEEDED
Status: DISCONTINUED | OUTPATIENT
Start: 2022-09-19 | End: 2022-09-19 | Stop reason: HOSPADM

## 2022-09-19 RX ORDER — PROCHLORPERAZINE EDISYLATE 5 MG/ML
2.5 INJECTION INTRAMUSCULAR; INTRAVENOUS ONCE
Status: COMPLETED | OUTPATIENT
Start: 2022-09-19 | End: 2022-09-19

## 2022-09-19 RX ADMIN — TETANUS TOXOID, REDUCED DIPHTHERIA TOXOID AND ACELLULAR PERTUSSIS VACCINE, ADSORBED 0.5 ML: 5; 2.5; 8; 8; 2.5 SUSPENSION INTRAMUSCULAR at 11:49

## 2022-09-19 RX ADMIN — SODIUM CHLORIDE 500 ML: 9 INJECTION, SOLUTION INTRAVENOUS at 09:05

## 2022-09-19 RX ADMIN — PROCHLORPERAZINE EDISYLATE 2.5 MG: 5 INJECTION INTRAMUSCULAR; INTRAVENOUS at 09:04

## 2022-09-19 RX ADMIN — MORPHINE SULFATE 2 MG: 2 INJECTION, SOLUTION INTRAMUSCULAR; INTRAVENOUS at 11:49

## 2022-09-19 RX ADMIN — MORPHINE SULFATE 2 MG: 2 INJECTION, SOLUTION INTRAMUSCULAR; INTRAVENOUS at 09:03

## 2022-09-19 RX ADMIN — SODIUM CHLORIDE 125 ML/HR: 9 INJECTION, SOLUTION INTRAVENOUS at 09:05

## 2022-09-19 NOTE — ED PROVIDER NOTES
Subjective   History of Present Illness  Patient is a 51-year-old female who presents by EMS after a fall at home this morning.  She reports that she was taking the dog downstairs to take it out, the dog got tangled up in her feet, she fell down approximately half a flight of stairs, striking her head on the sheet rock wall at the bottom.  No definite loss of consciousness, she states that she may have had a brief LOC, she is not sure.  She complains of pain mainly in her wrists bilaterally, right greater than left, also mild left frontal pain, mild left forearm pain, pain in her mid back.  She denies any vision changes, neck pain, chest pain, shortness of breath, abdominal pain, vomiting, bowel or bladder dysfunction, focal numbness or weakness, other symptoms or other complaints.  She denies pregnancy, has had a tubal ligation and an endometrial ablation.        Review of Systems   All other systems reviewed and are negative.      Past Medical History:   Diagnosis Date   • History of transfusion    • OAB (overactive bladder)    • PONV (postoperative nausea and vomiting)    • Spinal headache    • Thyroid cyst        Allergies   Allergen Reactions   • Sulfa Antibiotics Rash       Past Surgical History:   Procedure Laterality Date   •  SECTION      x 3   • COLONOSCOPY     • D & C WITH SUCTION     • DILATATION AND CURETTAGE     • ENDOMETRIAL ABLATION      gynecologic services thermal endometrial ablation   • HYSTEROSCOPY      NovaSure ablation   • PELVIC LAPAROSCOPY     • TUBAL ABDOMINAL LIGATION         Family History   Problem Relation Age of Onset   • Diabetes Mother    • Hyperlipidemia Mother    • Diabetes Father    • Heart disease Father    • Hyperlipidemia Father    • Hyperlipidemia Maternal Grandmother    • Hyperlipidemia Maternal Grandfather    • Heart attack Maternal Grandfather    • Hyperlipidemia Paternal Grandmother    • Hyperlipidemia Paternal Grandfather    • Diabetes Other    • Heart disease  Other         congenital   • Hypertension Other    • Skin cancer Other    • Breast cancer Neg Hx        Social History     Socioeconomic History   • Marital status:    Tobacco Use   • Smoking status: Never Smoker   • Smokeless tobacco: Never Used   Vaping Use   • Vaping Use: Never used   Substance and Sexual Activity   • Alcohol use: No   • Drug use: No   • Sexual activity: Defer     Partners: Male     Birth control/protection: Surgical           Objective   Physical Exam  Vitals and nursing note reviewed.   Constitutional:       General: She is not in acute distress.     Appearance: She is well-developed. She is not toxic-appearing or diaphoretic.      Comments: Pleasant female, alert and well-oriented, on a spine board with a cervical collar in place.  She does not appear to be in acute distress.   HENT:      Head: Normocephalic and atraumatic.      Comments: Mild abrasion/contusion left frontal/periorbital area.  No bony tenderness or deformity.  Eyes:      General: No scleral icterus.     Extraocular Movements: Extraocular movements intact.      Pupils: Pupils are equal, round, and reactive to light.   Neck:      Trachea: No tracheal deviation.      Comments: The neck is nontender, the trachea is midline.  The collar is left in place pending radiological evaluation.  Cardiovascular:      Rate and Rhythm: Normal rate and regular rhythm.   Pulmonary:      Effort: Pulmonary effort is normal. No respiratory distress.      Breath sounds: Normal breath sounds.   Chest:      Chest wall: Tenderness (Mild tenderness in the left mid ribs) present.   Abdominal:      General: Bowel sounds are normal.      Palpations: Abdomen is soft.      Tenderness: There is no abdominal tenderness. There is no guarding or rebound.   Musculoskeletal:      Cervical back: No tenderness.      Right lower leg: No edema.      Left lower leg: No edema.      Comments: Tenderness of the back in the lower thoracic/thoracolumbar junction  midline areas.  Tenderness of the right wrist at the distal radius, distal ulna, and snuffbox..  Tenderness of the left wrist at the distal radius without deformity.  Mild muscular tenderness throughout the left forearm.  Distal neurovascular intact throughout.  No other musculoskeletal tenderness.  Patient is logrolled off of the spine board.   Skin:     General: Skin is warm and dry.      Capillary Refill: Capillary refill takes less than 2 seconds.      Coloration: Skin is not pale.   Neurological:      General: No focal deficit present.      Mental Status: She is alert and oriented to person, place, and time.      GCS: GCS eye subscore is 4. GCS verbal subscore is 5. GCS motor subscore is 6.      Motor: No abnormal muscle tone.   Psychiatric:         Mood and Affect: Mood normal.         Behavior: Behavior normal.         Procedures  EKG shows sinus rhythm, rate 84.  OR interval 134, QRS duration 82, QTc 441 ms.  No apparent acute ischemia.  No evidence for STEMI.  XR Ribs 2 View Left   Final Result   Fracture of the left eighth rib laterally. No pneumothorax       This report was finalized on 9/19/2022 11:09 AM by Dr. Chapito Dean MD.          XR Forearm 2 View Left   Final Result     Comminuted fracture of the distal radius.       This report was finalized on 9/19/2022 9:49 AM by Dr. Chapito Dean MD.          XR Wrist 3+ View Left   Final Result     Comminuted distal radial fracture.       This report was finalized on 9/19/2022 9:46 AM by Dr. Chapito Dean MD.          XR Wrist 3+ View Right   Final Result     Fracture of the distal radius and ulnar styloid fracture.       This report was finalized on 9/19/2022 9:50 AM by Dr. Chapito Dean MD.          XR Pelvis 1 or 2 View   Final Result     No acute findings in the pelvis.       This report was finalized on 9/19/2022 9:52 AM by Dr. Chapito Dean MD.          XR Chest 1 View   Final Result   No radiographic evidence of acute cardiac or pulmonary disease.        This report was finalized on 9/19/2022 9:50 AM by Dr. Chapito Dean MD.          CT Head Without Contrast   Final Result     Unremarkable exam demonstrating no CT evidence of acute intracranial   findings.       This report was finalized on 9/19/2022 9:26 AM by Dr. Chapito Dean MD.          CT Facial Bones Without Contrast   Final Result   No acute facial bone fracture        This report was finalized on 9/19/2022 9:33 AM by Dr. Chapito Dean MD.          CT Cervical Spine Without Contrast   Final Result       1. No acute bony abnormality.       2. Other incidental findings as above       This report was finalized on 9/19/2022 9:25 AM by Dr. Chapito Dean MD.          CT Lumbar Spine Without Contrast   Final Result     No acute findings in the lumbar spine.       This report was finalized on 9/19/2022 9:36 AM by Dr. Chapito Dean MD.          CT Thoracic Spine Without Contrast   Final Result   1.  Lytic focus in T10 and T6, likely cavernous hemangiomas, but   consider scintigraphy.   2.  No acute fracture.       This report was finalized on 9/19/2022 9:37 AM by Dr. Chapito Dean MD.            Results for orders placed or performed during the hospital encounter of 09/19/22   Comprehensive Metabolic Panel    Specimen: Arm, Left; Blood   Result Value Ref Range    Glucose 112 (H) 65 - 99 mg/dL    BUN 10 6 - 20 mg/dL    Creatinine 0.85 0.57 - 1.00 mg/dL    Sodium 142 136 - 145 mmol/L    Potassium 4.0 3.5 - 5.2 mmol/L    Chloride 105 98 - 107 mmol/L    CO2 24.9 22.0 - 29.0 mmol/L    Calcium 9.8 8.6 - 10.5 mg/dL    Total Protein 7.1 6.0 - 8.5 g/dL    Albumin 4.60 3.50 - 5.20 g/dL    ALT (SGPT) 21 1 - 33 U/L    AST (SGOT) 21 1 - 32 U/L    Alkaline Phosphatase 106 39 - 117 U/L    Total Bilirubin 0.6 0.0 - 1.2 mg/dL    Globulin 2.5 gm/dL    A/G Ratio 1.8 g/dL    BUN/Creatinine Ratio 11.8 7.0 - 25.0    Anion Gap 12.1 5.0 - 15.0 mmol/L    eGFR 83.1 >60.0 mL/min/1.73   Troponin    Specimen: Arm, Left; Blood   Result Value  Ref Range    Troponin T <0.010 0.000 - 0.030 ng/mL   CBC Auto Differential    Specimen: Arm, Left; Blood   Result Value Ref Range    WBC 5.31 3.40 - 10.80 10*3/mm3    RBC 4.97 3.77 - 5.28 10*6/mm3    Hemoglobin 14.2 12.0 - 15.9 g/dL    Hematocrit 43.7 34.0 - 46.6 %    MCV 87.9 79.0 - 97.0 fL    MCH 28.6 26.6 - 33.0 pg    MCHC 32.5 31.5 - 35.7 g/dL    RDW 13.1 12.3 - 15.4 %    RDW-SD 41.9 37.0 - 54.0 fl    MPV 9.9 6.0 - 12.0 fL    Platelets 207 140 - 450 10*3/mm3    Neutrophil % 63.8 42.7 - 76.0 %    Lymphocyte % 26.6 19.6 - 45.3 %    Monocyte % 7.3 5.0 - 12.0 %    Eosinophil % 1.1 0.3 - 6.2 %    Basophil % 0.4 0.0 - 1.5 %    Immature Grans % 0.8 (H) 0.0 - 0.5 %    Neutrophils, Absolute 3.39 1.70 - 7.00 10*3/mm3    Lymphocytes, Absolute 1.41 0.70 - 3.10 10*3/mm3    Monocytes, Absolute 0.39 0.10 - 0.90 10*3/mm3    Eosinophils, Absolute 0.06 0.00 - 0.40 10*3/mm3    Basophils, Absolute 0.02 0.00 - 0.20 10*3/mm3    Immature Grans, Absolute 0.04 0.00 - 0.05 10*3/mm3    nRBC 0.0 0.0 - 0.2 /100 WBC   ECG 12 Lead   Result Value Ref Range    QT Interval 374 ms    QTC Interval 441 ms   Green Top (Gel)   Result Value Ref Range    Extra Tube Hold for add-ons.    Lavender Top   Result Value Ref Range    Extra Tube hold for add-on    Gold Top - SST   Result Value Ref Range    Extra Tube Hold for add-ons.    Light Blue Top   Result Value Ref Range    Extra Tube Hold for add-ons.                 ED Course  ED Course as of 09/21/22 1632   Mon Sep 19, 2022   1205 Patient's emergency department stay has not been complicated.  She has shown no signs of acute distress.  Patient, her  and I discussed all of her test results and her plan of care.  They voiced understanding and agreement.   has already called Dr. Willett's office for follow-up, and she has an appointment at 1:00 this afternoon. [CM]      ED Course User Index  [CM] Hosea Daly MD                                           MDM    Final diagnoses:    Closed fracture of both wrists, initial encounter   Closed fracture of one rib of left side, initial encounter       ED Disposition  ED Disposition     ED Disposition   Discharge    Condition   Stable    Comment   --             Canelo Willett MD  [address] [place] [address]  [phone]    Go to   This afternoon as scheduled    Nilda Zamorano MD  [address]  [facility] [address]  [phone]    Go to   Later this week, to schedule nuclear medicine bone scan, as we discussed    [facility]  [address] [place] [id number] [phone]  Go to   If symptoms worsen         Medication List      New Prescriptions    oxyCODONE-acetaminophen 5-325 MG per tablet  Commonly known as: PERCOCET  Take 1 tablet by mouth Every 6 (Six) Hours As Needed for Severe Pain.           Where to Get Your Medications      These medications were sent to [facility] [address] [place] [address]    Hours: 8AM-6PM Mon-Fri Phone: [phone]   · oxyCODONE-acetaminophen 5-325 MG per tablet       Please note that portions of this note were completed with a voice recognition program.        Hosea Daly MD  09/21/22 8917

## 2022-09-19 NOTE — DISCHARGE INSTRUCTIONS
Home in care of family.  See Dr. Willett at this afternoon as scheduled.  See Dr. Zamorano later this week, to schedule nuclear medicine bone scan, as we discussed.  Medication as directed.  Return to the emergency department right away if symptoms worsen/any problems.

## 2022-09-21 LAB
QT INTERVAL: 374 MS
QTC INTERVAL: 441 MS

## 2022-09-23 ENCOUNTER — ANESTHESIA (OUTPATIENT)
Dept: PERIOP | Facility: HOSPITAL | Age: 52
End: 2022-09-23

## 2022-09-23 ENCOUNTER — ANESTHESIA EVENT (OUTPATIENT)
Dept: PERIOP | Facility: HOSPITAL | Age: 52
End: 2022-09-23

## 2022-09-23 ENCOUNTER — HOSPITAL ENCOUNTER (OUTPATIENT)
Facility: HOSPITAL | Age: 52
Setting detail: HOSPITAL OUTPATIENT SURGERY
Discharge: HOME | End: 2022-09-23
Attending: ORTHOPAEDIC SURGERY | Admitting: ORTHOPAEDIC SURGERY

## 2022-09-23 ENCOUNTER — APPOINTMENT (OUTPATIENT)
Dept: GENERAL RADIOLOGY | Facility: HOSPITAL | Age: 52
End: 2022-09-23

## 2022-09-23 VITALS
HEART RATE: 69 BPM | SYSTOLIC BLOOD PRESSURE: 148 MMHG | OXYGEN SATURATION: 97 % | RESPIRATION RATE: 18 BRPM | BODY MASS INDEX: 25.27 KG/M2 | TEMPERATURE: 97.4 F | HEIGHT: 67 IN | DIASTOLIC BLOOD PRESSURE: 89 MMHG | WEIGHT: 161 LBS

## 2022-09-23 PROCEDURE — 76000 FLUOROSCOPY <1 HR PHYS/QHP: CPT | Performed by: RADIOLOGY

## 2022-09-23 PROCEDURE — C1713 ANCHOR/SCREW BN/BN,TIS/BN: HCPCS | Performed by: ORTHOPAEDIC SURGERY

## 2022-09-23 PROCEDURE — 25010000002 KETOROLAC TROMETHAMINE PER 15 MG: Performed by: NURSE ANESTHETIST, CERTIFIED REGISTERED

## 2022-09-23 PROCEDURE — 25010000002 ONDANSETRON PER 1 MG: Performed by: NURSE ANESTHETIST, CERTIFIED REGISTERED

## 2022-09-23 PROCEDURE — 25010000002 HYDROMORPHONE PER 4 MG: Performed by: NURSE ANESTHETIST, CERTIFIED REGISTERED

## 2022-09-23 PROCEDURE — 76000 FLUOROSCOPY <1 HR PHYS/QHP: CPT

## 2022-09-23 PROCEDURE — 25010000002 FENTANYL CITRATE (PF) 50 MCG/ML SOLUTION: Performed by: NURSE ANESTHETIST, CERTIFIED REGISTERED

## 2022-09-23 PROCEDURE — 25010000002 ROPIVACAINE PER 1 MG: Performed by: ANESTHESIOLOGY

## 2022-09-23 PROCEDURE — 25010000002 MIDAZOLAM PER 1MG: Performed by: ANESTHESIOLOGY

## 2022-09-23 PROCEDURE — L3660 SO 8 AB RSTR CAN/WEB PRE OTS: HCPCS | Performed by: ORTHOPAEDIC SURGERY

## 2022-09-23 PROCEDURE — 25010000002 PROPOFOL 10 MG/ML EMULSION: Performed by: NURSE ANESTHETIST, CERTIFIED REGISTERED

## 2022-09-23 PROCEDURE — 25010000002 CEFAZOLIN PER 500 MG: Performed by: ORTHOPAEDIC SURGERY

## 2022-09-23 DEVICE — IMPLANTABLE DEVICE: Type: IMPLANTABLE DEVICE | Site: WRIST | Status: FUNCTIONAL

## 2022-09-23 DEVICE — SCRW LK VA W STRDRV 2.4X12MM: Type: IMPLANTABLE DEVICE | Site: WRIST | Status: FUNCTIONAL

## 2022-09-23 DEVICE — BONE WAX
Type: IMPLANTABLE DEVICE | Site: WRIST | Status: FUNCTIONAL
Brand: ETHICON

## 2022-09-23 DEVICE — SCRW CORT S/TAP STRDRV 2.7X12MM: Type: IMPLANTABLE DEVICE | Site: WRIST | Status: FUNCTIONAL

## 2022-09-23 RX ORDER — ONDANSETRON 2 MG/ML
INJECTION INTRAMUSCULAR; INTRAVENOUS AS NEEDED
Status: DISCONTINUED | OUTPATIENT
Start: 2022-09-23 | End: 2022-09-23 | Stop reason: SURG

## 2022-09-23 RX ORDER — IBUPROFEN 600 MG/1
600 TABLET ORAL EVERY 6 HOURS PRN
Status: DISCONTINUED | OUTPATIENT
Start: 2022-09-23 | End: 2022-09-23 | Stop reason: HOSPADM

## 2022-09-23 RX ORDER — SODIUM CHLORIDE, SODIUM LACTATE, POTASSIUM CHLORIDE, CALCIUM CHLORIDE 600; 310; 30; 20 MG/100ML; MG/100ML; MG/100ML; MG/100ML
125 INJECTION, SOLUTION INTRAVENOUS ONCE
Status: COMPLETED | OUTPATIENT
Start: 2022-09-23 | End: 2022-09-23

## 2022-09-23 RX ORDER — OXYCODONE HYDROCHLORIDE AND ACETAMINOPHEN 5; 325 MG/1; MG/1
1 TABLET ORAL ONCE AS NEEDED
Status: COMPLETED | OUTPATIENT
Start: 2022-09-23 | End: 2022-09-23

## 2022-09-23 RX ORDER — HYDROMORPHONE HCL 110MG/55ML
PATIENT CONTROLLED ANALGESIA SYRINGE INTRAVENOUS AS NEEDED
Status: DISCONTINUED | OUTPATIENT
Start: 2022-09-23 | End: 2022-09-23 | Stop reason: SURG

## 2022-09-23 RX ORDER — ONDANSETRON 2 MG/ML
4 INJECTION INTRAMUSCULAR; INTRAVENOUS AS NEEDED
Status: DISCONTINUED | OUTPATIENT
Start: 2022-09-23 | End: 2022-09-23 | Stop reason: HOSPADM

## 2022-09-23 RX ORDER — SODIUM CHLORIDE 0.9 % (FLUSH) 0.9 %
10 SYRINGE (ML) INJECTION EVERY 12 HOURS SCHEDULED
Status: DISCONTINUED | OUTPATIENT
Start: 2022-09-23 | End: 2022-09-23 | Stop reason: HOSPADM

## 2022-09-23 RX ORDER — LIDOCAINE HYDROCHLORIDE 20 MG/ML
INJECTION, SOLUTION EPIDURAL; INFILTRATION; INTRACAUDAL; PERINEURAL AS NEEDED
Status: DISCONTINUED | OUTPATIENT
Start: 2022-09-23 | End: 2022-09-23 | Stop reason: SURG

## 2022-09-23 RX ORDER — PROPOFOL 10 MG/ML
VIAL (ML) INTRAVENOUS AS NEEDED
Status: DISCONTINUED | OUTPATIENT
Start: 2022-09-23 | End: 2022-09-23 | Stop reason: SURG

## 2022-09-23 RX ORDER — MEPERIDINE HYDROCHLORIDE 25 MG/ML
12.5 INJECTION INTRAMUSCULAR; INTRAVENOUS; SUBCUTANEOUS
Status: DISCONTINUED | OUTPATIENT
Start: 2022-09-23 | End: 2022-09-23 | Stop reason: HOSPADM

## 2022-09-23 RX ORDER — KETOROLAC TROMETHAMINE 30 MG/ML
30 INJECTION, SOLUTION INTRAMUSCULAR; INTRAVENOUS EVERY 6 HOURS PRN
Status: COMPLETED | OUTPATIENT
Start: 2022-09-23 | End: 2022-09-23

## 2022-09-23 RX ORDER — MIDAZOLAM HYDROCHLORIDE 2 MG/2ML
INJECTION, SOLUTION INTRAMUSCULAR; INTRAVENOUS AS NEEDED
Status: DISCONTINUED | OUTPATIENT
Start: 2022-09-23 | End: 2022-09-23 | Stop reason: SURG

## 2022-09-23 RX ORDER — IPRATROPIUM BROMIDE AND ALBUTEROL SULFATE 2.5; .5 MG/3ML; MG/3ML
3 SOLUTION RESPIRATORY (INHALATION) ONCE AS NEEDED
Status: DISCONTINUED | OUTPATIENT
Start: 2022-09-23 | End: 2022-09-23 | Stop reason: HOSPADM

## 2022-09-23 RX ORDER — HYDROCODONE BITARTRATE AND ACETAMINOPHEN 10; 325 MG/1; MG/1
1 TABLET ORAL EVERY 6 HOURS PRN
Qty: 20 TABLET | Refills: 0 | Status: SHIPPED | OUTPATIENT
Start: 2022-09-23

## 2022-09-23 RX ORDER — MIDAZOLAM HYDROCHLORIDE 1 MG/ML
1 INJECTION INTRAMUSCULAR; INTRAVENOUS
Status: DISCONTINUED | OUTPATIENT
Start: 2022-09-23 | End: 2022-09-23 | Stop reason: HOSPADM

## 2022-09-23 RX ORDER — FENTANYL CITRATE 50 UG/ML
50 INJECTION, SOLUTION INTRAMUSCULAR; INTRAVENOUS
Status: DISCONTINUED | OUTPATIENT
Start: 2022-09-23 | End: 2022-09-23 | Stop reason: HOSPADM

## 2022-09-23 RX ORDER — SCOLOPAMINE TRANSDERMAL SYSTEM 1 MG/1
1 PATCH, EXTENDED RELEASE TRANSDERMAL ONCE
Status: DISCONTINUED | OUTPATIENT
Start: 2022-09-23 | End: 2022-09-23 | Stop reason: HOSPADM

## 2022-09-23 RX ORDER — SODIUM CHLORIDE, SODIUM LACTATE, POTASSIUM CHLORIDE, CALCIUM CHLORIDE 600; 310; 30; 20 MG/100ML; MG/100ML; MG/100ML; MG/100ML
100 INJECTION, SOLUTION INTRAVENOUS ONCE AS NEEDED
Status: DISCONTINUED | OUTPATIENT
Start: 2022-09-23 | End: 2022-09-23 | Stop reason: HOSPADM

## 2022-09-23 RX ORDER — SODIUM CHLORIDE 0.9 % (FLUSH) 0.9 %
10 SYRINGE (ML) INJECTION AS NEEDED
Status: DISCONTINUED | OUTPATIENT
Start: 2022-09-23 | End: 2022-09-23 | Stop reason: HOSPADM

## 2022-09-23 RX ORDER — ROPIVACAINE HYDROCHLORIDE 5 MG/ML
INJECTION, SOLUTION EPIDURAL; INFILTRATION; PERINEURAL
Status: COMPLETED | OUTPATIENT
Start: 2022-09-23 | End: 2022-09-23

## 2022-09-23 RX ORDER — ONDANSETRON 2 MG/ML
4 INJECTION INTRAMUSCULAR; INTRAVENOUS ONCE AS NEEDED
Status: DISCONTINUED | OUTPATIENT
Start: 2022-09-23 | End: 2022-09-23 | Stop reason: HOSPADM

## 2022-09-23 RX ORDER — MAGNESIUM HYDROXIDE 1200 MG/15ML
LIQUID ORAL AS NEEDED
Status: DISCONTINUED | OUTPATIENT
Start: 2022-09-23 | End: 2022-09-23 | Stop reason: HOSPADM

## 2022-09-23 RX ORDER — FAMOTIDINE 10 MG/ML
INJECTION, SOLUTION INTRAVENOUS AS NEEDED
Status: DISCONTINUED | OUTPATIENT
Start: 2022-09-23 | End: 2022-09-23 | Stop reason: SURG

## 2022-09-23 RX ORDER — HYDROCODONE BITARTRATE AND ACETAMINOPHEN 7.5; 325 MG/1; MG/1
1 TABLET ORAL ONCE AS NEEDED
Status: DISCONTINUED | OUTPATIENT
Start: 2022-09-23 | End: 2022-09-23 | Stop reason: HOSPADM

## 2022-09-23 RX ADMIN — SCOPALAMINE 1 PATCH: 1 PATCH, EXTENDED RELEASE TRANSDERMAL at 07:44

## 2022-09-23 RX ADMIN — SODIUM CHLORIDE, POTASSIUM CHLORIDE, SODIUM LACTATE AND CALCIUM CHLORIDE: 600; 310; 30; 20 INJECTION, SOLUTION INTRAVENOUS at 08:50

## 2022-09-23 RX ADMIN — LIDOCAINE HYDROCHLORIDE 80 MG: 20 INJECTION, SOLUTION EPIDURAL; INFILTRATION; INTRACAUDAL; PERINEURAL at 08:11

## 2022-09-23 RX ADMIN — HYDROMORPHONE HYDROCHLORIDE 2 MG: 2 INJECTION, SOLUTION INTRAMUSCULAR; INTRAVENOUS; SUBCUTANEOUS at 08:16

## 2022-09-23 RX ADMIN — FENTANYL CITRATE 50 MCG: 50 INJECTION, SOLUTION INTRAMUSCULAR; INTRAVENOUS at 09:54

## 2022-09-23 RX ADMIN — OXYCODONE HYDROCHLORIDE AND ACETAMINOPHEN 1 TABLET: 5; 325 TABLET ORAL at 10:23

## 2022-09-23 RX ADMIN — MIDAZOLAM HYDROCHLORIDE 2 MG: 1 INJECTION, SOLUTION INTRAMUSCULAR; INTRAVENOUS at 07:48

## 2022-09-23 RX ADMIN — ONDANSETRON 4 MG: 2 INJECTION INTRAMUSCULAR; INTRAVENOUS at 11:07

## 2022-09-23 RX ADMIN — PROPOFOL 180 MG: 10 INJECTION, EMULSION INTRAVENOUS at 08:11

## 2022-09-23 RX ADMIN — ROPIVACAINE HYDROCHLORIDE 20 MG: 5 INJECTION, SOLUTION EPIDURAL; INFILTRATION; PERINEURAL at 07:51

## 2022-09-23 RX ADMIN — SODIUM CHLORIDE, POTASSIUM CHLORIDE, SODIUM LACTATE AND CALCIUM CHLORIDE: 600; 310; 30; 20 INJECTION, SOLUTION INTRAVENOUS at 08:04

## 2022-09-23 RX ADMIN — ONDANSETRON HYDROCHLORIDE 4 MG: 2 INJECTION, SOLUTION INTRAMUSCULAR; INTRAVENOUS at 08:04

## 2022-09-23 RX ADMIN — CEFAZOLIN 2 G: 2 INJECTION, POWDER, FOR SOLUTION INTRAMUSCULAR; INTRAVENOUS at 08:11

## 2022-09-23 RX ADMIN — KETOROLAC TROMETHAMINE 30 MG: 30 INJECTION, SOLUTION INTRAMUSCULAR at 10:23

## 2022-09-23 RX ADMIN — FAMOTIDINE 20 MG: 10 INJECTION INTRAVENOUS at 08:04

## 2022-09-23 NOTE — BRIEF OP NOTE
WRIST OPEN REDUCTION INTERNAL FIXATION, WRIST CLOSED REDUCTION  Progress Note    Marichuy Dinh  9/23/2022    Pre-op Diagnosis:   S62.91XA S62.92XA       Post-Op Diagnosis Codes:  Left and right wrist fracture  Left wrist fracture intra-articular 3 part displaced  Right wrist fracture 2 part dorsally tilted    Procedure/CPT® Codes:        Procedure(s):  LEFT WRIST OPEN REDUCTION INTERNAL FIXATION  RIGHT WRIST CLOSED REDUCTION        Surgeon(s):  Canelo Willett MD    Anesthesia: General    Staff:   * No surgical staff found *         Estimated Blood Loss: minimal    Urine Voided: * No values recorded between 9/23/2022  8:06 AM and 9/23/2022  8:07 AM *    Specimens:                None          Drains: * No LDAs found *    Findings: Bilateral wrist fracture          Complications: None       was responsible for performing the following activities: Retraction, Suction, Irrigation, Suturing, Closing and Placing Dressing and their skilled assistance was necessary for the success of this case.    Canelo Willett MD     Date: 9/23/2022  Time: 08:07 EDT

## 2022-09-23 NOTE — ANESTHESIA PREPROCEDURE EVALUATION
Anesthesia Evaluation     history of anesthetic complications: PONV  NPO Solid Status: > 8 hours  NPO Liquid Status: > 8 hours           Airway   Mallampati: II  TM distance: >3 FB  Neck ROM: full  No difficulty expected  Dental - normal exam     Pulmonary - normal exam   Cardiovascular - normal exam        Neuro/Psych  (+) headaches,    GI/Hepatic/Renal/Endo    (+)   thyroid problem     Musculoskeletal     Abdominal  - normal exam    Bowel sounds: normal.   Substance History      OB/GYN          Other                        Anesthesia Plan    ASA 2     general with block     intravenous induction     Anesthetic plan, risks, benefits, and alternatives have been provided, discussed and informed consent has been obtained with: patient.        CODE STATUS:

## 2022-09-23 NOTE — OP NOTE
Operative report  Preoperative diagnosis left wrist fracture intra-articular 3 parts Lugo type  Right wrist fracture dorsally tilted to parts letty type  Postoperative diagnosis the same  Surgical procedure left wrist open reduction and internal fixation of the 3 parts intra-articular fracture with volar plating Synthes.  Locking plate and screw  Right wrist closed reduction and casting  After proper patient and limb identification bring to the operating room with general anesthesia dorsal decubitus chlorhexidine scrubbing sterile draping proper timeout performed.  Left wrist tourniquet applied 250 mm pressure longitudinal incision volar aspect of the forearm distally on the radial side.  Using the interval between the flexor carpi radialis and radial neurovascular bundle.  Detachment of pronator quadratus which was retracted ulnarly.  Manipulation of the fragment to reduce them and that the medically then secured with the volar plate and screw.  Irrigating thoroughly with sterile fluid C arm x-rays show good alignment of fracture fragment and hardware.  Tourniquet was shut down pronator was reattached with Vicryl 1 interrupted suture skin was closed with Monocryl 2 oh and metal clip.  Large dressing was applied short arm cast was applied.    The right wrist proceeding with manipulation with a volar deviation C arm show anatomical alignment of the fracture application of a short arm cast molded and volar deviation.  Transcast x-rays show good alignment.    Patient returned to recovery room in stable condition blood loss minimal no specimen

## 2022-09-23 NOTE — ANESTHESIA PROCEDURE NOTES
Peripheral Block    Pre-sedation assessment completed: 9/23/2022 7:45 AM    Patient reassessed immediately prior to procedure    Patient location during procedure: holding area  Stop time: 9/23/2022 7:52 AM  Reason for block: at surgeon's request  Performed by  Anesthesiologist: Franklyn Osullivan MD  Preanesthetic Checklist  Completed: patient identified, IV checked, site marked, risks and benefits discussed, surgical consent, monitors and equipment checked, pre-op evaluation and timeout performed  Prep:  Pt Position: supine  Sterile barriers:cap, gloves and sterile barriers  Prep: ChloraPrep  Patient monitoring: blood pressure monitoring, continuous pulse oximetry and EKG  Procedure    Sedation: yes  Performed under: MAC  Guidance:nerve stimulator  Images:still images not obtained  Loss of twitch: 0.5 mA  Laterality:left  Block Type:supraclavicular  Injection Technique:single-shotNeedle Gauge:20 G  Resistance on Injection: less than 15 psi    Medications Used: ropivacaine (NAROPIN) injection 0.5 %, 20 mg      Post Assessment  Patient Tolerance:comfortable throughout block  Complications:no

## 2022-09-23 NOTE — ANESTHESIA PROCEDURE NOTES
Airway  Urgency: elective    Date/Time: 9/23/2022 8:12 AM  Airway not difficult    General Information and Staff    Patient location during procedure: OR  Anesthesiologist: Franklyn Osullivan MD  CRNA/CAA: Clifford Meyer CRNA    Indications and Patient Condition    Preoxygenated: yes  MILS not maintained throughout  Mask difficulty assessment: 0 - not attempted    Final Airway Details  Final airway type: supraglottic airway      Successful airway: unique  Size 4    Number of attempts at approach: 1  Assessment: lips, teeth, and gum same as pre-op and atraumatic intubation    Additional Comments  Atraumatic LMA placement, dentition unchanged.

## 2022-11-02 ENCOUNTER — TREATMENT (OUTPATIENT)
Dept: PHYSICAL THERAPY | Facility: CLINIC | Age: 52
End: 2022-11-02

## 2022-11-02 DIAGNOSIS — S62.102D CLOSED FRACTURE OF LEFT WRIST WITH ROUTINE HEALING, SUBSEQUENT ENCOUNTER: Primary | ICD-10-CM

## 2022-11-02 DIAGNOSIS — S62.101D CLOSED FRACTURE OF RIGHT WRIST WITH ROUTINE HEALING, SUBSEQUENT ENCOUNTER: ICD-10-CM

## 2022-11-02 PROCEDURE — 97162 PT EVAL MOD COMPLEX 30 MIN: CPT | Performed by: PHYSICAL THERAPIST

## 2022-11-02 NOTE — PROGRESS NOTES
Physical Therapy Initial Evaluation and Plan of Care    Patient: Marichuy Dinh   : 1970  Diagnosis/ICD-10 Code:  Closed fracture of left wrist with routine healing, subsequent encounter [S62.102D]  Referring practitioner: Canelo Willett MD  Date of Initial Visit: 2022  Today's Date: 2022  Patient seen for 1 session         Visit Diagnoses:    ICD-10-CM ICD-9-CM   1. Closed fracture of left wrist with routine healing, subsequent encounter  S62.102D V54.19   2. Closed fracture of right wrist with routine healing, subsequent encounter  S62.101D V54.19         Subjective Questionnaire: QuickDASH: 68%      Subjective Evaluation    History of Present Illness  Date of onset: 10/19/2022  Date of surgery: 10/23/2022  Mechanism of injury: On 10/19/2022, while letting her dog out to use the bathroom, her dog tripped her and she fell down a flight of stairs.  The patient was taken to a local ER and was diagnosed with two wrist fractures, a left rib fracture and a concussion.  The patient was discharged from the ER and later was evaluated by MD Willett on the same day.  She received an ORIF on the left wrist on 10/23/2022 and was casted for both wrists.  The right wrist was casted four weeks and the left was casted for two weeks.  The patient's was last follow up was on 10/31/2022 and she was advised to start therapy.      Patient Occupation: OT   Precautions and Work Restrictions: no restrictionsQuality of life: good    Pain  Current pain ratin  At best pain ratin  At worst pain rating: 10  Location: both wrist  Quality: sharp  Relieving factors: medications and rest  Aggravating factors: keyboarding, lifting, movement, overhead activity, outstretched reach and repetitive movement  Progression: improved    Hand dominance: left    Patient Goals  Patient goals for therapy: decreased edema, decreased pain, increased motion, increased strength, independence with ADLs/IADLs and return to  sport/leisure activities             Objective          Observations     Additional Wrist/Hand Observation Details  Edema noted to both wrists and hands; left wrist incision demonstrated good healing' pt presents with slumped posture with bilateral UE guarding    Neurological Testing     Sensation     Wrist/Hand   Left   Intact: light touch    Right   Intact: light touch    Active Range of Motion     Left Elbow   Forearm supination: 20 degrees   Forearm pronation: 50 degrees     Right Elbow   Forearm supination: 30 degrees   Forearm pronation: 35 degrees     Left Wrist   Wrist flexion: 26 degrees   Wrist extension: 16 degrees     Right Wrist   Wrist flexion: 20 degrees   Wrist extension: 0 degrees     Strength/Myotome Testing     Left Wrist/Hand      (2nd hand position)     Trial 1: 6 lbs    Trial 2: 7 lbs    Trial 3: 5 lbs    Average: 6 lbs    Right Wrist/Hand      (2nd hand position)     Trial 1: 10 lbs    Trial 2: 10 lbs    Trial 3: 11 lbs    Average: 10.33 lbs    Additional Strength Details  Wrist MMT deferred due to limited ROM and pain          Assessment & Plan     Assessment  Impairments: abnormal coordination, abnormal muscle firing, abnormal or restricted ROM, activity intolerance, impaired physical strength, lacks appropriate home exercise program, pain with function and safety issue  Functional Limitations: carrying objects, lifting, pulling, pushing, uncomfortable because of pain, reaching behind back and unable to perform repetitive tasks  Assessment details: Pt is a 52 y/o female referred to therapy for treatment for a closed right wrist fracture and and a left wrist ORIF.  Pt presents with decreased wrist ROM, decreased strength and increased pain with functional activities. Therapy will follow for restoration of wrist ROM and improved functional  to improve home ADLs.  Prognosis: good    Goals  Plan Goals: STG 6 weeks    1 Pt will be instructed in a HEP.  2 Pt will improve her Quick Dash  to less than 40%.  3 Pt will demonstrate 40 degrees of bilateral wrist flexion and extensions to aid with dressing and bathing.    LTG 12 weeks    1 Pt will demonstrate 40# of bilateral  strength to improve opening tight jars at home.  2 Pt will be able to lift a coffee pot of gallon of mils with each wrist and functionally pour without increased pain.  3 Pt will demonstrate 60 degrees of bilateral wrist flexion and extension to enhance home ADLs.  4 Pt will report pain no greater than 3/10 with moderate house work.        Plan  Therapy options: will be seen for skilled therapy services  Planned modality interventions: cryotherapy, thermotherapy (hydrocollator packs), ultrasound and thermotherapy (paraffin bath)  Planned therapy interventions: ADL retraining, balance/weight-bearing training, flexibility, functional ROM exercises, home exercise program, IADL retraining, joint mobilization, manual therapy, neuromuscular re-education, prosthetic fitting/training, strengthening, stretching and therapeutic activities  Frequency: 3x week  Duration in weeks: 12  Treatment plan discussed with: patient  Plan details: Will follow for optimal gains.    Moderate Evaluation  14951  Re-evaluation   90301  Therapeutic exercise  27722  Therapeutic activity    21941  Neuromuscular re-education   60587  Manual therapy   34503  Unattended e-stim (Medicaid/Medicare)     Moist heat/cryotherapy 42210   Ultrasound   35756  Paraffin   14587              Timed:         Manual Therapy:         mins  74555;     Therapeutic Exercise:         mins  33225;     Neuromuscular Leilani:        mins  01630;    Therapeutic Activity:          mins  48138;     Gait Training:           mins  38479;     Ultrasound:          mins  43910;    Ionto                                   mins   67583  Self Care                            mins   47989  Canalith Repos         mins 53188      Un-Timed:  Electrical Stimulation:         mins  99907 (  );  Dry Needling          mins self-pay  Traction          mins 53301  Low Eval          Mins  93636  Mod Eval     45     Mins  20666  High Eval                            Mins  51837        Timed Treatment:      mins   Total Treatment:     45   mins          PT: David Wood PT     License Number: [id number]  Electronically signed by David Wood PT, 11/02/22, 2:10 PM EDT    Certification Period: 11/2/2022 thru 1/30/2023  I certify that the therapy services are furnished while this patient is under my care.  The services outlined above are required by this patient, and will be reviewed every 90 days.         Physician Signature:__________________________________________________    PHYSICIAN: Canelo Willett MD  NPI: [id number]                                      DATE:      Please sign and return via fax to [email or web address] . Thank you, [facility].

## 2022-11-03 ENCOUNTER — TREATMENT (OUTPATIENT)
Dept: PHYSICAL THERAPY | Facility: CLINIC | Age: 52
End: 2022-11-03

## 2022-11-03 DIAGNOSIS — S62.102D CLOSED FRACTURE OF LEFT WRIST WITH ROUTINE HEALING, SUBSEQUENT ENCOUNTER: Primary | ICD-10-CM

## 2022-11-03 DIAGNOSIS — S62.101D CLOSED FRACTURE OF RIGHT WRIST WITH ROUTINE HEALING, SUBSEQUENT ENCOUNTER: ICD-10-CM

## 2022-11-03 PROCEDURE — 97110 THERAPEUTIC EXERCISES: CPT | Performed by: PHYSICAL THERAPIST

## 2022-11-03 PROCEDURE — 97140 MANUAL THERAPY 1/> REGIONS: CPT | Performed by: PHYSICAL THERAPIST

## 2022-11-03 NOTE — PROGRESS NOTES
Physical Therapy Daily Treatment Note      Patient: Marichuy Dinh   : 1970  Referring practitioner: Canelo Willett MD  Date of Initial Visit: Type: THERAPY  Noted: 2022  Today's Date: 11/3/2022  Patient seen for 2 sessions       Visit Diagnoses:    ICD-10-CM ICD-9-CM   1. Closed fracture of left wrist with routine healing, subsequent encounter  S62.102D V54.19   2. Closed fracture of right wrist with routine healing, subsequent encounter  S62.101D V54.19       Subjective Evaluation    History of Present Illness    Subjective comment: Pt has increased stiffness in both wrists.       Objective   See Exercise, Manual, and Modality Logs for complete treatment.       Assessment & Plan     Assessment    Assessment details: Tx today initiated with mh to bilateral wrists; followed by manual stretching and exercises for improved mobility and function; retrograde massage to right wrist for decreased edema and ended with ice to both wrists.  Pt reported improved mobility following session today.    Plan  Plan details: Will follow progressing wrist ROM and improved function with ADLs.          Timed:         Manual Therapy:    14     mins  06073;     Therapeutic Exercise:    30     mins  77048;     Neuromuscular Leilani:        mins  10748;    Therapeutic Activity:          mins  52924;     Gait Training:           mins  90742;     Ultrasound:          mins  88689;    Ionto                                   mins   62604  Self Care                            mins   53691  Canalith Repos         mins 19184      Un-Timed:  Electrical Stimulation:         mins  10124 ( );  Dry Needling          mins self-pay  Traction          mins 42291      Timed Treatment:   44   mins   Total Treatment:     56   Mins(6 min mh and 6 min ice)    David Wood PT  [place] License: [id number]      Electronically signed by David Wood PT, 22, 10:07 AM EDT

## 2022-11-07 ENCOUNTER — TREATMENT (OUTPATIENT)
Dept: PHYSICAL THERAPY | Facility: CLINIC | Age: 52
End: 2022-11-07

## 2022-11-07 DIAGNOSIS — S62.102D CLOSED FRACTURE OF LEFT WRIST WITH ROUTINE HEALING, SUBSEQUENT ENCOUNTER: Primary | ICD-10-CM

## 2022-11-07 DIAGNOSIS — S62.101D CLOSED FRACTURE OF RIGHT WRIST WITH ROUTINE HEALING, SUBSEQUENT ENCOUNTER: ICD-10-CM

## 2022-11-07 PROCEDURE — 97140 MANUAL THERAPY 1/> REGIONS: CPT | Performed by: PHYSICAL THERAPIST

## 2022-11-07 PROCEDURE — 97110 THERAPEUTIC EXERCISES: CPT | Performed by: PHYSICAL THERAPIST

## 2022-11-07 NOTE — PROGRESS NOTES
Physical Therapy Daily Treatment Note      Patient: Marichuy Dinh   : 1970  Referring practitioner: Canelo Willett MD  Date of Initial Visit: Type: THERAPY  Noted: 2022  Today's Date: 2022  Patient seen for 3 sessions       Visit Diagnoses:    ICD-10-CM ICD-9-CM   1. Closed fracture of left wrist with routine healing, subsequent encounter  S62.102D V54.19   2. Closed fracture of right wrist with routine healing, subsequent encounter  S62.101D V54.19       Subjective   Patient states that she is having some soreness in both of her wrists. Patient reports that she is able to move her right hand more and has better  strength.     Objective   See Exercise, Manual, and Modality Logs for complete treatment.       Assessment/Plan  Patient tolerated treatment session well with rest breaks taken as needed by the patient. New therex added per the patient's tolerance, patient demonstrated and understood new therex with no increase in pain noted. Educated patient to perform therex per her tolerance, patient verbalized understanding. No adverse reactions with modalities or treatment session. Clothes pin resistance increased to green.  Soreness noted following treatment session. Continue per PT's POC, progress exercises per the patient's tolerance.     Timed:         Manual Therapy:    16     mins  19158;     Therapeutic Exercise:    30     mins  98328;     Neuromuscular Leilani:        mins  73869;    Therapeutic Activity:          mins  05063;     Gait Training:           mins  11303;     Ultrasound:          mins  19206;    Ionto                                   mins   89975  Self Care                            mins   41388  Canalith Repos         mins 56344      Un-Timed:  Electrical Stimulation:         mins  46877 ( );  Dry Needling          mins self-pay  Traction          mins 25638      Timed Treatment:   46   mins   Total Treatment:     58   mins (6 minutes moist heat and 6 minute of  ice)    Mallory Bansal, PTA  [place] License: [id number]

## 2022-11-09 ENCOUNTER — TREATMENT (OUTPATIENT)
Dept: PHYSICAL THERAPY | Facility: CLINIC | Age: 52
End: 2022-11-09

## 2022-11-09 DIAGNOSIS — S62.102D CLOSED FRACTURE OF LEFT WRIST WITH ROUTINE HEALING, SUBSEQUENT ENCOUNTER: Primary | ICD-10-CM

## 2022-11-09 DIAGNOSIS — S62.101D CLOSED FRACTURE OF RIGHT WRIST WITH ROUTINE HEALING, SUBSEQUENT ENCOUNTER: ICD-10-CM

## 2022-11-09 PROCEDURE — 97110 THERAPEUTIC EXERCISES: CPT | Performed by: PHYSICAL THERAPIST

## 2022-11-09 PROCEDURE — 97140 MANUAL THERAPY 1/> REGIONS: CPT | Performed by: PHYSICAL THERAPIST

## 2022-11-09 NOTE — PROGRESS NOTES
Physical Therapy Daily Treatment Note      Patient: Marichuy Dinh   : 1970  Referring practitioner: Canelo Willett MD  Date of Initial Visit: Type: THERAPY  Noted: 2022  Today's Date: 2022  Patient seen for 4 sessions       Visit Diagnoses:    ICD-10-CM ICD-9-CM   1. Closed fracture of left wrist with routine healing, subsequent encounter  S62.102D V54.19   2. Closed fracture of right wrist with routine healing, subsequent encounter  S62.101D V54.19       Subjective Evaluation    History of Present Illness    Subjective comment: Pt has increased stiffness today.  Pt is perfroming laundry easier at home. Pt was able to open a water bottle.       Objective   See Exercise, Manual, and Modality Logs for complete treatment.       Assessment & Plan     Assessment    Assessment details: Tx today consisted of mh to both wrists; followed by manual stretching and exercises for both wrist and hands with added thera putty activities and ended with stm to right hand wrist and ice post tx. Pt noted to have improvements with functional ROM and  strength.  Pt tolerated thera putty exercises well today,    Plan  Plan details: Will follow progressing hand and wrist functional mobility.          Timed:         Manual Therapy:    10     mins  10081;     Therapeutic Exercise:    33     mins  02805;     Neuromuscular Leilani:        mins  06353;    Therapeutic Activity:          mins  06780;     Gait Training:           mins  36828;     Ultrasound:          mins  45975;    Ionto                                   mins   35095  Self Care                            mins   33519  Canalith Repos         mins 25444      Un-Timed:  Electrical Stimulation:         mins  45791 (MC );  Dry Needling          mins self-pay  Traction          mins 42640      Timed Treatment:   43   mins   Total Treatment:     55   Mins(6min mh and 6 min ice)    David Wood, PT  [place] License: [id number]      Electronically signed by  David Wood, PT, 11/09/22, 1:04 PM EST

## 2022-11-11 ENCOUNTER — TREATMENT (OUTPATIENT)
Dept: PHYSICAL THERAPY | Facility: CLINIC | Age: 52
End: 2022-11-11

## 2022-11-11 DIAGNOSIS — S62.102D CLOSED FRACTURE OF LEFT WRIST WITH ROUTINE HEALING, SUBSEQUENT ENCOUNTER: Primary | ICD-10-CM

## 2022-11-11 DIAGNOSIS — S62.101D CLOSED FRACTURE OF RIGHT WRIST WITH ROUTINE HEALING, SUBSEQUENT ENCOUNTER: ICD-10-CM

## 2022-11-11 PROCEDURE — 97110 THERAPEUTIC EXERCISES: CPT | Performed by: PHYSICAL THERAPIST

## 2022-11-11 PROCEDURE — 97140 MANUAL THERAPY 1/> REGIONS: CPT | Performed by: PHYSICAL THERAPIST

## 2022-11-11 PROCEDURE — 97530 THERAPEUTIC ACTIVITIES: CPT | Performed by: PHYSICAL THERAPIST

## 2022-11-11 NOTE — PROGRESS NOTES
Physical Therapy Daily Treatment Note      Patient: Marichuy Dinh   : 1970  Referring practitioner: Canelo Willett MD  Date of Initial Visit: Type: THERAPY  Noted: 2022  Today's Date: 2022  Patient seen for 5 sessions       Visit Diagnoses:    ICD-10-CM ICD-9-CM   1. Closed fracture of left wrist with routine healing, subsequent encounter  S62.102D V54.19   2. Closed fracture of right wrist with routine healing, subsequent encounter  S62.101D V54.19       Subjective Evaluation    History of Present Illness    Subjective comment: Pt reports having increased wrist stiffness today.       Objective   See Exercise, Manual, and Modality Logs for complete treatment.       Assessment & Plan     Assessment    Assessment details: Tx today consisted of mh to both wrists; followed by manual stretching, there ex for functional mobility, and ended with stm to right wrist and hand and ice.  Pt responded well to added UE bike and rolling of putty.  Pt demonstrated increased tightness today that may be contributed by the rain.    Plan  Plan details: Will follow progressing wrist mobility and improved function.          Timed:         Manual Therapy:  14       mins  69630;     Therapeutic Exercise:    28     mins  19118;     Neuromuscular Leilani:        mins  98078;    Therapeutic Activity:     12     mins  24040;     Gait Training:           mins  97881;     Ultrasound:          mins  99179;    Ionto                                   mins   38572  Self Care                            mins   94295  Canalith Repos         mins 87902      Un-Timed:  Electrical Stimulation:         mins  97766 ( );  Dry Needling          mins self-pay  Traction          mins 73271      Timed Treatment:   54   mins   Total Treatment:     66   Mins(6 min mh and 6 min ice)    David Wood PT  [place] License: [id number]      Electronically signed by David Wood PT, 22, 2:10 PM EST

## 2022-11-14 ENCOUNTER — TREATMENT (OUTPATIENT)
Dept: PHYSICAL THERAPY | Facility: CLINIC | Age: 52
End: 2022-11-14

## 2022-11-14 DIAGNOSIS — S62.101D CLOSED FRACTURE OF RIGHT WRIST WITH ROUTINE HEALING, SUBSEQUENT ENCOUNTER: ICD-10-CM

## 2022-11-14 DIAGNOSIS — S62.102D CLOSED FRACTURE OF LEFT WRIST WITH ROUTINE HEALING, SUBSEQUENT ENCOUNTER: Primary | ICD-10-CM

## 2022-11-14 PROCEDURE — 97140 MANUAL THERAPY 1/> REGIONS: CPT | Performed by: PHYSICAL THERAPIST

## 2022-11-14 PROCEDURE — 97110 THERAPEUTIC EXERCISES: CPT | Performed by: PHYSICAL THERAPIST

## 2022-11-14 PROCEDURE — 97530 THERAPEUTIC ACTIVITIES: CPT | Performed by: PHYSICAL THERAPIST

## 2022-11-16 ENCOUNTER — TREATMENT (OUTPATIENT)
Dept: PHYSICAL THERAPY | Facility: CLINIC | Age: 52
End: 2022-11-16

## 2022-11-16 DIAGNOSIS — S62.102D CLOSED FRACTURE OF LEFT WRIST WITH ROUTINE HEALING, SUBSEQUENT ENCOUNTER: Primary | ICD-10-CM

## 2022-11-16 DIAGNOSIS — S62.101D CLOSED FRACTURE OF RIGHT WRIST WITH ROUTINE HEALING, SUBSEQUENT ENCOUNTER: ICD-10-CM

## 2022-11-16 PROCEDURE — 97140 MANUAL THERAPY 1/> REGIONS: CPT | Performed by: PHYSICAL THERAPIST

## 2022-11-16 PROCEDURE — 97110 THERAPEUTIC EXERCISES: CPT | Performed by: PHYSICAL THERAPIST

## 2022-11-16 PROCEDURE — 97530 THERAPEUTIC ACTIVITIES: CPT | Performed by: PHYSICAL THERAPIST

## 2022-11-16 NOTE — PROGRESS NOTES
Physical Therapy Daily Treatment Note      Patient: Marichuy Dinh   : 1970  Referring practitioner: Canelo Willett MD  Date of Initial Visit: Type: THERAPY  Noted: 2022  Today's Date: 2022  Patient seen for 7 sessions       Visit Diagnoses:    ICD-10-CM ICD-9-CM   1. Closed fracture of left wrist with routine healing, subsequent encounter  S62.102D V54.19   2. Closed fracture of right wrist with routine healing, subsequent encounter  S62.101D V54.19       Subjective Evaluation    History of Present Illness    Subjective comment: Pt reports having less wrist tightness today.       Objective   See Exercise, Manual, and Modality Logs for complete treatment.       Assessment & Plan     Assessment    Assessment details: Tx today consisted of mh to both wrists followed by manual stretching to both wrists; followed by exercises for improved ROM and stability and ended with stm to right wrist and hand and ice post tx.  Pt responded well to added green putty and wall push ups.  Pt demonstrated good effort with reports of 3/10 post pain.  Pt noted to have right wrist weakness with push ups today.    Plan  Plan details: Will follow progressing functional  and improved wrist ROM.          Timed:         Manual Therapy:    14     mins  32667;     Therapeutic Exercise:    26     mins  59029;     Neuromuscular Leilani:        mins  49170;    Therapeutic Activity:    14      mins  16760;     Gait Training:           mins  21612;     Ultrasound:          mins  30781;    Ionto                                   mins   89587  Self Care                            mins   53779  Canalith Repos         mins 32799      Un-Timed:  Electrical Stimulation:         mins  85762 (MC );  Dry Needling          mins self-pay  Traction          mins 16009      Timed Treatment:   54   mins   Total Treatment:     66   Mins(6 min mh and 6 min ice)    David Wood PT  [place] License: [id number]      Electronically signed by  David Wood, PT, 11/16/22, 2:05 PM EST

## 2022-11-21 ENCOUNTER — TREATMENT (OUTPATIENT)
Dept: PHYSICAL THERAPY | Facility: CLINIC | Age: 52
End: 2022-11-21

## 2022-11-21 DIAGNOSIS — S62.102D CLOSED FRACTURE OF LEFT WRIST WITH ROUTINE HEALING, SUBSEQUENT ENCOUNTER: Primary | ICD-10-CM

## 2022-11-21 DIAGNOSIS — S62.101D CLOSED FRACTURE OF RIGHT WRIST WITH ROUTINE HEALING, SUBSEQUENT ENCOUNTER: ICD-10-CM

## 2022-11-21 PROCEDURE — 97140 MANUAL THERAPY 1/> REGIONS: CPT | Performed by: PHYSICAL THERAPIST

## 2022-11-21 PROCEDURE — 97110 THERAPEUTIC EXERCISES: CPT | Performed by: PHYSICAL THERAPIST

## 2022-11-21 NOTE — PROGRESS NOTES
Physical Therapy Daily Treatment Note      Patient: Marichuy Dinh   : 1970  Referring practitioner: Canelo Willett MD  Date of Initial Visit: Type: THERAPY  Noted: 2022  Today's Date: 2022  Patient seen for 8 sessions       Visit Diagnoses:    ICD-10-CM ICD-9-CM   1. Closed fracture of left wrist with routine healing, subsequent encounter  S62.102D V54.19   2. Closed fracture of right wrist with routine healing, subsequent encounter  S62.101D V54.19       Subjective Evaluation    History of Present Illness    Subjective comment: Pt reports she noticed increased redness along the distal left wrist incision and was examined and she was placed on a antibiotic. The wrist was examined today and was noted to have good improvements.  Pt reports having 2/10 pain today.       Objective   See Exercise, Manual, and Modality Logs for complete treatment.       Assessment & Plan     Assessment    Assessment details: Tx today consisted of mh to bilateral wrists; followed by manual stretching; there ex for improved functional mobility; stm to right wrist and ended with ice.  Pt responded well to tx with noted gains with wrist ROM and  strength.  Pt responded well to black clothespins today.  Pt reported 1/10 post pain.    Plan  Plan details: Will follow progressing wrist stability and mobility as tolerated.          Timed:         Manual Therapy:    14     mins  94465;     Therapeutic Exercise:    31     mins  16428;     Neuromuscular Leilani:        mins  80367;    Therapeutic Activity:          mins  62006;     Gait Training:           mins  46300;     Ultrasound:          mins  95343;    Ionto                                   mins   04438  Self Care                            mins   42468  Canalith Repos         mins 52086      Un-Timed:  Electrical Stimulation:         mins  72417 ( );  Dry Needling          mins self-pay  Traction          mins 35158      Timed Treatment:   45   mins   Total  Treatment:     57   Mins(6 min mh and 6 min ice)    David Wood PT  [place] License: [id number]      Electronically signed by David Wood PT, 11/21/22, 2:14 PM EST

## 2022-11-23 ENCOUNTER — TREATMENT (OUTPATIENT)
Dept: PHYSICAL THERAPY | Facility: CLINIC | Age: 52
End: 2022-11-23

## 2022-11-23 DIAGNOSIS — S62.101D CLOSED FRACTURE OF RIGHT WRIST WITH ROUTINE HEALING, SUBSEQUENT ENCOUNTER: ICD-10-CM

## 2022-11-23 DIAGNOSIS — S62.102D CLOSED FRACTURE OF LEFT WRIST WITH ROUTINE HEALING, SUBSEQUENT ENCOUNTER: Primary | ICD-10-CM

## 2022-11-23 PROCEDURE — 97140 MANUAL THERAPY 1/> REGIONS: CPT | Performed by: PHYSICAL THERAPIST

## 2022-11-23 PROCEDURE — 97110 THERAPEUTIC EXERCISES: CPT | Performed by: PHYSICAL THERAPIST

## 2022-11-23 NOTE — PROGRESS NOTES
Physical Therapy Daily Treatment Note      Patient: Marichuy Dinh   : 1970  Referring practitioner: Canelo Willett MD  Date of Initial Visit: Type: THERAPY  Noted: 2022  Today's Date: 2022  Patient seen for 9 sessions       Visit Diagnoses:    ICD-10-CM ICD-9-CM   1. Closed fracture of left wrist with routine healing, subsequent encounter  S62.102D V54.19   2. Closed fracture of right wrist with routine healing, subsequent encounter  S62.101D V54.19       Subjective Evaluation    History of Present Illness    Subjective comment: Pt reports having 3/10 soreness today.       Objective   See Exercise, Manual, and Modality Logs for complete treatment.       Assessment & Plan     Assessment    Assessment details: Tx today consisted of mh to bilat wrists; manual stretching, there ex and functional ADL training; ended with stm to right wrist and ice.  Pt tolerated added resistance with wrist AROM well today and was noted to have improvement with wrist flexion and ext today.  Pt reported no pain following session.    Plan  Plan details: Will follow progressing wrist functional mobility and ADLs.          Timed:         Manual Therapy:   14      mins  25546;     Therapeutic Exercise:    33     mins  80988;     Neuromuscular Leilani:        mins  88433;    Therapeutic Activity:          mins  29011;     Gait Training:           mins  48330;     Ultrasound:          mins  32894;    Ionto                                   mins   99633  Self Care                            mins   37631  Canalith Repos         mins 58083      Un-Timed:  Electrical Stimulation:         mins  67775 ( );  Dry Needling          mins self-pay  Traction          mins 06521      Timed Treatment:   47   mins   Total Treatment:     59   Mins(6min ice and 6 min mh)    David Wood PT  [place] License: [id number]      Electronically signed by David Wood PT, 22, 2:44 PM EST

## 2022-11-28 ENCOUNTER — TREATMENT (OUTPATIENT)
Dept: PHYSICAL THERAPY | Facility: CLINIC | Age: 52
End: 2022-11-28

## 2022-11-28 DIAGNOSIS — S62.102D CLOSED FRACTURE OF LEFT WRIST WITH ROUTINE HEALING, SUBSEQUENT ENCOUNTER: Primary | ICD-10-CM

## 2022-11-28 DIAGNOSIS — S62.101D CLOSED FRACTURE OF RIGHT WRIST WITH ROUTINE HEALING, SUBSEQUENT ENCOUNTER: ICD-10-CM

## 2022-11-28 PROCEDURE — 97110 THERAPEUTIC EXERCISES: CPT | Performed by: PHYSICAL THERAPIST

## 2022-11-28 PROCEDURE — 97140 MANUAL THERAPY 1/> REGIONS: CPT | Performed by: PHYSICAL THERAPIST

## 2022-11-28 NOTE — PROGRESS NOTES
Physical Therapy Daily Treatment Note      Patient: Marichuy Dinh   : 1970  Referring practitioner: Canelo Willett MD  Date of Initial Visit: Type: THERAPY  Noted: 2022  Today's Date: 2022  Patient seen for 10 sessions       Visit Diagnoses:    ICD-10-CM ICD-9-CM   1. Closed fracture of left wrist with routine healing, subsequent encounter  S62.102D V54.19   2. Closed fracture of right wrist with routine healing, subsequent encounter  S62.101D V54.19       Subjective Evaluation    History of Present Illness    Subjective comment: Pt reports having 2/10 pain today.  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       Objective   See Exercise, Manual, and Modality Logs for complete treatment.       Assessment & Plan     Assessment    Assessment details: Tx today consisted of mh to both wrists; followed by manual stretching; functional wrist stability training and ended with stm to right wrist and ice to both wrists.  Pt responded well to added med ball roll up wall, velcro wrist twist and wrist roller.  Pt reported increased fatigue following session and reported 2/10 post pain.    Plan  Plan details: Will follow progressing wrist mobility and function.          Timed:         Manual Therapy:    14     mins  85979;     Therapeutic Exercise:    31     mins  24946;     Neuromuscular Leilani:        mins  97243;    Therapeutic Activity:          mins  00624;     Gait Training:           mins  24786;     Ultrasound:          mins  47506;    Ionto                                   mins   90502  Self Care                            mins   36311  Canalith Repos         mins 51627      Un-Timed:  Electrical Stimulation:         mins  80058 (MC );  Dry Needling          mins self-pay  Traction          mins 34796      Timed Treatment:   45   mins   Total Treatment:     56   Mins(6 min mh and 5 min ice)    David Wood PT  [place] License: [id number]      Electronically signed by David Wood,  PT, 11/28/22, 2:23 PM EST

## 2022-11-30 ENCOUNTER — TREATMENT (OUTPATIENT)
Dept: PHYSICAL THERAPY | Facility: CLINIC | Age: 52
End: 2022-11-30

## 2022-11-30 DIAGNOSIS — S62.101D CLOSED FRACTURE OF RIGHT WRIST WITH ROUTINE HEALING, SUBSEQUENT ENCOUNTER: ICD-10-CM

## 2022-11-30 DIAGNOSIS — S62.102D CLOSED FRACTURE OF LEFT WRIST WITH ROUTINE HEALING, SUBSEQUENT ENCOUNTER: Primary | ICD-10-CM

## 2022-11-30 PROCEDURE — 97530 THERAPEUTIC ACTIVITIES: CPT | Performed by: PHYSICAL THERAPIST

## 2022-11-30 PROCEDURE — 97140 MANUAL THERAPY 1/> REGIONS: CPT | Performed by: PHYSICAL THERAPIST

## 2022-11-30 PROCEDURE — 97110 THERAPEUTIC EXERCISES: CPT | Performed by: PHYSICAL THERAPIST

## 2022-11-30 NOTE — PROGRESS NOTES
Physical Therapy Daily Treatment Note      Patient: Marichuy Dinh   : 1970  Referring practitioner: Canelo Willett MD  Date of Initial Visit: Type: THERAPY  Noted: 2022  Today's Date: 2022  Patient seen for 11 sessions       Visit Diagnoses:    ICD-10-CM ICD-9-CM   1. Closed fracture of left wrist with routine healing, subsequent encounter  S62.102D V54.19   2. Closed fracture of right wrist with routine healing, subsequent encounter  S62.101D V54.19       Subjective Evaluation    History of Present Illness    Subjective comment: Pt reports being stiffer today.       Objective   See Exercise, Manual, and Modality Logs for complete treatment.       Assessment & Plan     Assessment    Assessment details: Tx today consisted of mh to bilateral wrists followed by manual stretching; there ex progressed with added weights and ended with stm to right wrist and hand and ended with ice.  Pt demonstrated good effort with noted improvements with overhead reaching, UE bike and mobility.      Plan  Plan details: Will follow progressing wrist and hand stability and function.  Will perform progress report next session.          Timed:         Manual Therapy:    14     mins  48468;     Therapeutic Exercise:    27     mins  81018;     Neuromuscular Leilani:        mins  06834;    Therapeutic Activity:     12     mins  73139;     Gait Training:           mins  57528;     Ultrasound:          mins  09312;    Ionto                                   mins   89314  Self Care                            mins   29665  Canalith Repos         mins 83982      Un-Timed:  Electrical Stimulation:         mins  15671 ( );  Dry Needling          mins self-pay  Traction          mins 63091      Timed Treatment:   53   mins   Total Treatment:     64   Mins(6 min mh and 5 min ice)    David Wood PT  [place] License: [id number]      Electronically signed by David Wood PT, 22, 2:10 PM EST

## 2022-12-02 ENCOUNTER — TREATMENT (OUTPATIENT)
Dept: PHYSICAL THERAPY | Facility: CLINIC | Age: 52
End: 2022-12-02

## 2022-12-02 DIAGNOSIS — S62.101D CLOSED FRACTURE OF RIGHT WRIST WITH ROUTINE HEALING, SUBSEQUENT ENCOUNTER: ICD-10-CM

## 2022-12-02 DIAGNOSIS — S62.102D CLOSED FRACTURE OF LEFT WRIST WITH ROUTINE HEALING, SUBSEQUENT ENCOUNTER: Primary | ICD-10-CM

## 2022-12-02 PROCEDURE — 97110 THERAPEUTIC EXERCISES: CPT | Performed by: PHYSICAL THERAPIST

## 2022-12-02 PROCEDURE — 97140 MANUAL THERAPY 1/> REGIONS: CPT | Performed by: PHYSICAL THERAPIST

## 2022-12-02 PROCEDURE — 97530 THERAPEUTIC ACTIVITIES: CPT | Performed by: PHYSICAL THERAPIST

## 2022-12-02 NOTE — PROGRESS NOTES
Physical Therapy Re Certification Of Plan of Care  Patient: Marichuy Dinh   : 1970  Diagnosis/ICD-10 Code:  Closed fracture of left wrist with routine healing, subsequent encounter [S62.102D]  Referring practitioner: Canelo Willett MD  Date of Initial Visit: Type: THERAPY  Noted: 2022  Today's Date: 2022  Patient seen for 12 sessions         Visit Diagnoses:    ICD-10-CM ICD-9-CM   1. Closed fracture of left wrist with routine healing, subsequent encounter  S62.102D V54.19   2. Closed fracture of right wrist with routine healing, subsequent encounter  S62.101D V54.19         Marichuy Dinh reports:   Subjective Questionnaire: QuickDASH: 45%  Clinical Progress: improved  Home Program Compliance: Yes  Treatment has included: therapeutic exercise, neuromuscular re-education, manual therapy, therapeutic activity, ultrasound, moist heat and cryotherapy      Subjective Evaluation    History of Present Illness    Subjective comment: Pt reports having improvemetns with opening doors, opening containers, dressing and bathing.  Pt able to lift lighter weights at home.Pain  Current pain ratin  At best pain ratin  At worst pain ratin  Location: bilateral wrists             Objective          Passive Range of Motion     Left Elbow   Forearm supination: WFL  Forearm pronation: WFL    Right Elbow   Forearm supination: WFL  Forearm pronation: 66 degrees     Left Wrist   Wrist flexion: 54 degrees   Wrist extension: 48 degrees     Right Wrist   Wrist flexion: 60 degrees   Wrist extension: 42 degrees     Strength/Myotome Testing     Left Wrist/Hand      (2nd hand position)     Trial 1: 20 lbs    Trial 2: 24 lbs    Trial 3: 18 lbs    Average: 20.67 lbs    Right Wrist/Hand      (2nd hand position)     Trial 1: 20 lbs    Trial 2: 22 lbs    Trial 3: 22 lbs    Average: 21.33 lbs          Assessment & Plan     Assessment  Impairments: abnormal coordination, abnormal muscle firing, abnormal or  restricted ROM, activity intolerance, impaired physical strength, lacks appropriate home exercise program, pain with function and safety issue  Functional Limitations: carrying objects, lifting, pulling, pushing, uncomfortable because of pain, reaching behind back and unable to perform repetitive tasks  Assessment details: Pt is a 50 y/o female referred to therapy for treatment for a closed right wrist fracture and and a left wrist ORIF.  Pt has attended 12 sessions with good effort, improved ROM, improved strength and decreased pain.  Pt improved her Quick Dash score from 68% to 45% and her bilateral  strength has improved to 20 pounds.  Pt is encouraged and will benefit from cont tx for max gains.  Pt reported 1/10 post pain.  Prognosis: good    Goals  Plan Goals: STG 6 weeks    1 Pt will be instructed in a HEP.met  2 Pt will improve her Quick Dash to less than 40%.progressing  3 Pt will demonstrate 40 degrees of bilateral wrist flexion and extensions to aid with dressing and bathing.met    LTG 12 weeks    1 Pt will demonstrate 40# of bilateral  strength to improve opening tight jars at home.not met  2 Pt will be able to lift a coffee pot or gallon of milk with each wrist and functionally pour without increased pain.not met  3 Pt will demonstrate 60 degrees of bilateral wrist flexion and extension to enhance home ADLs.not met  4 Pt will report pain no greater than 3/10 with moderate house work.  met      Plan  Therapy options: will be seen for skilled therapy services  Planned modality interventions: cryotherapy, thermotherapy (hydrocollator packs), ultrasound and thermotherapy (paraffin bath)  Planned therapy interventions: ADL retraining, balance/weight-bearing training, flexibility, functional ROM exercises, home exercise program, IADL retraining, joint mobilization, manual therapy, neuromuscular re-education, prosthetic fitting/training, strengthening, stretching and therapeutic activities  Frequency:  3x week  Duration in weeks: 8  Treatment plan discussed with: patient  Plan details: Will follow for optimal gains.    Moderate Evaluation  99262  Re-evaluation   66324  Therapeutic exercise  59261  Therapeutic activity    53784  Neuromuscular re-education   03056  Manual therapy   47933  Unattended e-stim (Medicaid/Medicare)     Moist heat/cryotherapy 30078   Ultrasound   80571  Paraffin   22673               Recommendations: Continue as planned  Timeframe: 2 months  Prognosis to achieve goals: good      Timed:         Manual Therapy:    14     mins  76957;     Therapeutic Exercise:    31     mins  18044;     Neuromuscular Leilani:        mins  54075;    Therapeutic Activity:     11     mins  62255;     Gait Training:           mins  61332;     Ultrasound:          mins  73080;    Ionto                                   mins   71464  Self Care                            mins   72040  Canalith Repos         mins 47738      Un-Timed:  Electrical Stimulation:         mins  12539 ( );  Dry Needling          mins self-pay  Traction          mins 58102  Re-Eval                               mins  59997      Timed Treatment:   56   mins   Total Treatment:     67   Mins(6 min mh and 5 min ice)          PT: David Wood PT     [place] License:  [id number]    Electronically signed by David Wood PT, 12/02/22, 2:03 PM EST    Certification Period: 12/2/2022 thru 3/1/2023  I certify that the therapy services are furnished while this patient is under my care.  The services outlined above are required by this patient, and will be reviewed every 90 days.         Physician Signature:__________________________________________________    PHYSICIAN: Canelo Willett MD  NPI: [id number]                                      DATE:  :     Please sign and return via fax to [email or web address] . Thank you, [facility]

## 2022-12-07 ENCOUNTER — TREATMENT (OUTPATIENT)
Dept: PHYSICAL THERAPY | Facility: CLINIC | Age: 52
End: 2022-12-07

## 2022-12-07 DIAGNOSIS — S62.101D CLOSED FRACTURE OF RIGHT WRIST WITH ROUTINE HEALING, SUBSEQUENT ENCOUNTER: ICD-10-CM

## 2022-12-07 DIAGNOSIS — S62.102D CLOSED FRACTURE OF LEFT WRIST WITH ROUTINE HEALING, SUBSEQUENT ENCOUNTER: Primary | ICD-10-CM

## 2022-12-07 PROCEDURE — 97140 MANUAL THERAPY 1/> REGIONS: CPT | Performed by: PHYSICAL THERAPIST

## 2022-12-07 PROCEDURE — 97110 THERAPEUTIC EXERCISES: CPT | Performed by: PHYSICAL THERAPIST

## 2022-12-07 PROCEDURE — 97530 THERAPEUTIC ACTIVITIES: CPT | Performed by: PHYSICAL THERAPIST

## 2022-12-07 NOTE — PROGRESS NOTES
Physical Therapy Daily Treatment Note      Patient: Marichuy Dinh   : 1970  Referring practitioner: Canelo Willett MD  Date of Initial Visit: Type: THERAPY  Noted: 2022  Today's Date: 2022  Patient seen for 13 sessions       Visit Diagnoses:    ICD-10-CM ICD-9-CM   1. Closed fracture of left wrist with routine healing, subsequent encounter  S62.102D V54.19   2. Closed fracture of right wrist with routine healing, subsequent encounter  S62.101D V54.19       Subjective Evaluation    History of Present Illness    Subjective comment: Pt strained her right wrist last Monday.  Pt is feeling better today.  Pt reports her MD was pleased with her progress.       Objective   See Exercise, Manual, and Modality Logs for complete treatment.       Assessment & Plan     Assessment    Assessment details: Tx today consisted of mh to bilateral wrists; followed by manual stretching and exercises for improved mobility and stability and ended with stm to left wrist and scar massage.  Pt demonstrated good effort and responded well to added exercises today.  Pt reported increased fatigue following session.    Plan  Plan details: Will follow progressing wrist ROM and stability.          Timed:         Manual Therapy:    14     mins  26109;     Therapeutic Exercise:    26     mins  22584;     Neuromuscular Leilani:        mins  16009;    Therapeutic Activity:     14     mins  66075;     Gait Training:           mins  11570;     Ultrasound:          mins  21522;    Ionto                                   mins   85667  Self Care                            mins   86735  Can[employer]         mins 76548      Un-Timed:  Electrical Stimulation:        mins  33324 ( );  Dry Needling          mins self-pay  Traction          mins 27462      Timed Treatment:  54    mins   Total Treatment:     54   mins    David Wood PT  [place] License: [id number]      Electronically signed by David Wood PT, 22, 11:17 AM  EST

## 2022-12-08 ENCOUNTER — TREATMENT (OUTPATIENT)
Dept: PHYSICAL THERAPY | Facility: CLINIC | Age: 52
End: 2022-12-08

## 2022-12-08 DIAGNOSIS — S62.101D CLOSED FRACTURE OF RIGHT WRIST WITH ROUTINE HEALING, SUBSEQUENT ENCOUNTER: ICD-10-CM

## 2022-12-08 DIAGNOSIS — S62.102D CLOSED FRACTURE OF LEFT WRIST WITH ROUTINE HEALING, SUBSEQUENT ENCOUNTER: Primary | ICD-10-CM

## 2022-12-08 PROCEDURE — 97140 MANUAL THERAPY 1/> REGIONS: CPT | Performed by: PHYSICAL THERAPIST

## 2022-12-08 PROCEDURE — 97110 THERAPEUTIC EXERCISES: CPT | Performed by: PHYSICAL THERAPIST

## 2022-12-08 PROCEDURE — 97530 THERAPEUTIC ACTIVITIES: CPT | Performed by: PHYSICAL THERAPIST

## 2022-12-08 NOTE — PROGRESS NOTES
Physical Therapy Daily Treatment Note      Patient: Marichuy Dinh   : 1970  Referring practitioner: Canelo Willett MD  Date of Initial Visit: Type: THERAPY  Noted: 2022  Today's Date: 2022  Patient seen for 14 sessions       Visit Diagnoses:    ICD-10-CM ICD-9-CM   1. Closed fracture of left wrist with routine healing, subsequent encounter  S62.102D V54.19   2. Closed fracture of right wrist with routine healing, subsequent encounter  S62.101D V54.19       Subjective Evaluation    History of Present Illness    Subjective comment: Pt has no complaints today.  Pt reports she has been trying to play other instruments at home.       Objective   See Exercise, Manual, and Modality Logs for complete treatment.       Assessment & Plan     Assessment    Assessment details: Tx today consisted of mh to bilateral wrists followed by manual stretching, there ex and functional ADL training with increased resistance and ended with stm to left wrist and scar massage and ice.  Pt responded well to tx.    Plan  Plan details: Will cont per poc for improved wrist mobility and functional stability.          Timed:         Manual Therapy:    13     mins  43666;     Therapeutic Exercise:    32     mins  27285;     Neuromuscular Leilani:        mins  65901;    Therapeutic Activity:     11     mins  73302;     Gait Training:           mins  95101;     Ultrasound:          mins  50929;    Ionto                                   mins   16509  Self Care                            mins   39372  Canalith Repos         mins 82427      Un-Timed:  Electrical Stimulation:         mins  72560 ( );  Dry Needling          mins self-pay  Traction          mins 94867      Timed Treatment:   56   mins   Total Treatment:     67   Mins(6 min mh and 5 min ice)    David Wood PT  [place] License: [id number]      Electronically signed by David Wood PT, 22, 12:43 PM EST

## 2022-12-09 ENCOUNTER — TELEPHONE (OUTPATIENT)
Dept: PHYSICAL THERAPY | Facility: CLINIC | Age: 52
End: 2022-12-09

## 2022-12-12 ENCOUNTER — TREATMENT (OUTPATIENT)
Dept: PHYSICAL THERAPY | Facility: CLINIC | Age: 52
End: 2022-12-12

## 2022-12-12 DIAGNOSIS — S62.102D CLOSED FRACTURE OF LEFT WRIST WITH ROUTINE HEALING, SUBSEQUENT ENCOUNTER: Primary | ICD-10-CM

## 2022-12-12 DIAGNOSIS — S62.101D CLOSED FRACTURE OF RIGHT WRIST WITH ROUTINE HEALING, SUBSEQUENT ENCOUNTER: ICD-10-CM

## 2022-12-12 PROCEDURE — 97110 THERAPEUTIC EXERCISES: CPT | Performed by: PHYSICAL THERAPIST

## 2022-12-12 PROCEDURE — 97140 MANUAL THERAPY 1/> REGIONS: CPT | Performed by: PHYSICAL THERAPIST

## 2022-12-12 PROCEDURE — 97530 THERAPEUTIC ACTIVITIES: CPT | Performed by: PHYSICAL THERAPIST

## 2022-12-12 NOTE — PROGRESS NOTES
Physical Therapy Daily Treatment Note      Patient: Marichuy Dinh   : 1970  Referring practitioner: Canelo Willett MD  Date of Initial Visit: Type: THERAPY  Noted: 2022  Today's Date: 2022  Patient seen for 15 sessions       Visit Diagnoses:    ICD-10-CM ICD-9-CM   1. Closed fracture of left wrist with routine healing, subsequent encounter  S62.102D V54.19   2. Closed fracture of right wrist with routine healing, subsequent encounter  S62.101D V54.19       Subjective Evaluation    History of Present Illness    Subjective comment: Pt reports missing last weeks apt due to having to  her child.       Objective   See Exercise, Manual, and Modality Logs for complete treatment.       Assessment & Plan     Assessment    Assessment details: Tx today consisted of mh to B wrists; followed by manual stretching, there ex for improved hand and wrist mobility and function and ended with stm and scar massage to left wrist and ice to both wrists.  Pt responded well to added bar push ups and was noted to have weakness with ball circles.  Pt reported no pain following session.    Plan  Plan details: Will follow progressing per protocol.          Timed:         Manual Therapy:    13     mins  26481;     Therapeutic Exercise:    29     mins  80128;     Neuromuscular Leilani:        mins  42439;    Therapeutic Activity:     12     mins  91888;     Gait Training:           mins  13003;     Ultrasound:          mins  34609;    Ionto                                   mins   81724  Self Care                            mins   68649  Canalith [employer]os         mins 54177      Un-Timed:  Electrical Stimulation:         mins  11812 (MC );  Dry Needling          mins self-pay  Traction          mins 47415      Timed Treatment:   54   mins   Total Treatment:     65   Mins(6min mh and 5 min ice)    David oWod PT  [place] License: [id number]      Electronically signed by David Wood PT, 22, 1:05 PM  EST

## 2022-12-14 ENCOUNTER — TREATMENT (OUTPATIENT)
Dept: PHYSICAL THERAPY | Facility: CLINIC | Age: 52
End: 2022-12-14

## 2022-12-14 DIAGNOSIS — S62.101D CLOSED FRACTURE OF RIGHT WRIST WITH ROUTINE HEALING, SUBSEQUENT ENCOUNTER: ICD-10-CM

## 2022-12-14 DIAGNOSIS — S62.102D CLOSED FRACTURE OF LEFT WRIST WITH ROUTINE HEALING, SUBSEQUENT ENCOUNTER: Primary | ICD-10-CM

## 2022-12-14 PROCEDURE — 97110 THERAPEUTIC EXERCISES: CPT | Performed by: PHYSICAL THERAPIST

## 2022-12-14 PROCEDURE — 97530 THERAPEUTIC ACTIVITIES: CPT | Performed by: PHYSICAL THERAPIST

## 2022-12-14 PROCEDURE — 97140 MANUAL THERAPY 1/> REGIONS: CPT | Performed by: PHYSICAL THERAPIST

## 2022-12-14 NOTE — PROGRESS NOTES
Physical Therapy Daily Treatment Note      Patient: Marichuy Dinh   : 1970  Referring practitioner: Canelo Willett MD  Date of Initial Visit: Type: THERAPY  Noted: 2022  Today's Date: 2022  Patient seen for 16 sessions       Visit Diagnoses:    ICD-10-CM ICD-9-CM   1. Closed fracture of left wrist with routine healing, subsequent encounter  S62.102D V54.19   2. Closed fracture of right wrist with routine healing, subsequent encounter  S62.101D V54.19       Subjective Evaluation    History of Present Illness    Subjective comment: Pt reports her wrists are moving better today.       Objective   See Exercise, Manual, and Modality Logs for complete treatment.       Assessment & Plan     Assessment    Assessment details: Tx today consisted of mh to both wrists; followed by manual stretching, exercises for improved wrist and forearm stability and ended with stm to left wrist and scar tissue massage.  Pt responded well to sup and pro with 2 pounds and was noted to have improved wrist flexion with stretching.  Pt cont to report right wrist weakness with II bar push ups.    Plan  Plan details: Will follow progressing wrist stability and improved function.          Timed:         Manual Therapy:    13     mins  84164;     Therapeutic Exercise:    20     mins  36233;     Neuromuscular Leilani:        mins  66773;    Therapeutic Activity:    14      mins  75723;     Gait Training:           mins  34538;     Ultrasound:          mins  31243;    Ionto                                   mins   12489  Self Care                            mins   97872  Canalith [employer]os         mins 54766      Un-Timed:  Electrical Stimulation:         mins  45688 (MC );  Dry Needling          mins self-pay  Traction          mins 74684      Timed Treatment:   47   mins   Total Treatment:     58   Mins(6min mh and 5 min ice)    David Wood PT  [place] License: [id number]      Electronically signed by Davdi Wood PT,  12/14/22, 2:19 PM EST

## 2022-12-15 ENCOUNTER — TELEPHONE (OUTPATIENT)
Dept: PHYSICAL THERAPY | Facility: CLINIC | Age: 52
End: 2022-12-15

## 2022-12-15 NOTE — TELEPHONE ENCOUNTER
Caller: Marichuy Dinh    Relationship: Self         What was the call regarding:HAS A CONFLICT

## 2022-12-19 ENCOUNTER — TREATMENT (OUTPATIENT)
Dept: PHYSICAL THERAPY | Facility: CLINIC | Age: 52
End: 2022-12-19

## 2022-12-19 DIAGNOSIS — S62.101D CLOSED FRACTURE OF RIGHT WRIST WITH ROUTINE HEALING, SUBSEQUENT ENCOUNTER: ICD-10-CM

## 2022-12-19 DIAGNOSIS — S62.102D CLOSED FRACTURE OF LEFT WRIST WITH ROUTINE HEALING, SUBSEQUENT ENCOUNTER: Primary | ICD-10-CM

## 2022-12-19 PROCEDURE — 97110 THERAPEUTIC EXERCISES: CPT | Performed by: PHYSICAL THERAPIST

## 2022-12-19 PROCEDURE — 97140 MANUAL THERAPY 1/> REGIONS: CPT | Performed by: PHYSICAL THERAPIST

## 2022-12-19 PROCEDURE — 97530 THERAPEUTIC ACTIVITIES: CPT | Performed by: PHYSICAL THERAPIST

## 2022-12-19 NOTE — PROGRESS NOTES
Physical Therapy Daily Treatment Note      Patient: Marichuy Dinh   : 1970  Referring practitioner: Canelo Willett MD  Date of Initial Visit: Type: THERAPY  Noted: 2022  Today's Date: 2022  Patient seen for 17 sessions       Visit Diagnoses:    ICD-10-CM ICD-9-CM   1. Closed fracture of left wrist with routine healing, subsequent encounter  S62.102D V54.19   2. Closed fracture of right wrist with routine healing, subsequent encounter  S62.101D V54.19       Subjective Evaluation    History of Present Illness    Subjective comment: Pt has no pain today.       Objective   See Exercise, Manual, and Modality Logs for complete treatment.       Assessment & Plan     Assessment    Assessment details: Tx today consisted of mh to bilateral wrists; followed by exercises for improved wrist mobility and functional stability; stm and scar massage to left wrist and ended with ice.  Pt demonstrated good effort and responded well to added resistance with exercises.  Pt noted to have improvements with loading of the wrist with push ups and ball circles.  Pt reported no pain following session.    Plan  Plan details: Will follow progressing wrist stability and function.          Timed:         Manual Therapy:    12     mins  06399;     Therapeutic Exercise:    31     mins  14860;     Neuromuscular Leilani:        mins  65822;    Therapeutic Activity:     11     mins  81354;     Gait Training:           mins  65236;     Ultrasound:          mins  35798;    Ionto                                   mins   62316  Self Care                            mins   60888  Canalith Repos         mins 60010      Un-Timed:  Electrical Stimulation:         mins  54884 ( );  Dry Needling          mins self-pay  Traction          mins 67792      Timed Treatment:   54   mins   Total Treatment:     65   Mins(6min mh and 5 min ice)    David Wood PT  [place] License: [id number]      Electronically signed by David Wood,  PT, 12/19/22, 1:04 PM EST

## 2022-12-21 ENCOUNTER — TREATMENT (OUTPATIENT)
Dept: PHYSICAL THERAPY | Facility: CLINIC | Age: 52
End: 2022-12-21

## 2022-12-21 DIAGNOSIS — S62.102D CLOSED FRACTURE OF LEFT WRIST WITH ROUTINE HEALING, SUBSEQUENT ENCOUNTER: Primary | ICD-10-CM

## 2022-12-21 DIAGNOSIS — S62.101D CLOSED FRACTURE OF RIGHT WRIST WITH ROUTINE HEALING, SUBSEQUENT ENCOUNTER: ICD-10-CM

## 2022-12-21 PROCEDURE — 97110 THERAPEUTIC EXERCISES: CPT | Performed by: PHYSICAL THERAPIST

## 2022-12-21 PROCEDURE — 97530 THERAPEUTIC ACTIVITIES: CPT | Performed by: PHYSICAL THERAPIST

## 2022-12-21 PROCEDURE — 97140 MANUAL THERAPY 1/> REGIONS: CPT | Performed by: PHYSICAL THERAPIST

## 2022-12-21 NOTE — PROGRESS NOTES
Physical Therapy Daily Treatment Note      Patient: Marichuy Dinh   : 1970  Referring practitioner: Canelo Willett MD  Date of Initial Visit: Type: THERAPY  Noted: 2022  Today's Date: 2022  Patient seen for 18 sessions       Visit Diagnoses:    ICD-10-CM ICD-9-CM   1. Closed fracture of left wrist with routine healing, subsequent encounter  S62.102D V54.19   2. Closed fracture of right wrist with routine healing, subsequent encounter  S62.101D V54.19       Subjective Evaluation    History of Present Illness    Subjective comment: Pt has no complaints today.       Objective   See Exercise, Manual, and Modality Logs for complete treatment.       Assessment & Plan     Assessment    Assessment details: Tx today consisted of mh to bilat wrists; followed by manual stretching of both wrists; exercises for improved gross motor function and ended with stm and scar massage to left wrist and ice.  Pt responded well to added cable pulls today with no increased pain. Pt noted to have better eccentric control with sup and pronation.    Plan  Plan details: Will follow progressing toward HEP as discharge.          Timed:         Manual Therapy:    12     mins  13929;     Therapeutic Exercise:    19     mins  22150;     Neuromuscular Leilani:        mins  46796;    Therapeutic Activity:     11     mins  49445;     Gait Training:           mins  86199;     Ultrasound:          mins  50418;    Ionto                                   mins   01344  Self Care                            mins   77356  Canalith Repos         mins 27259      Un-Timed:  Electrical Stimulation:         mins  10980 ( );  Dry Needling          mins self-pay  Traction          mins 00265      Timed Treatment:   42   mins   Total Treatment:     53   Mins(6min mh and 5 min ice)    David Wood PT  [place] License: [id number]      Electronically signed by David Wood PT, 22, 3:14 PM EST

## 2022-12-22 ENCOUNTER — TREATMENT (OUTPATIENT)
Dept: PHYSICAL THERAPY | Facility: CLINIC | Age: 52
End: 2022-12-22

## 2022-12-22 DIAGNOSIS — S62.102D CLOSED FRACTURE OF LEFT WRIST WITH ROUTINE HEALING, SUBSEQUENT ENCOUNTER: Primary | ICD-10-CM

## 2022-12-22 DIAGNOSIS — S62.101D CLOSED FRACTURE OF RIGHT WRIST WITH ROUTINE HEALING, SUBSEQUENT ENCOUNTER: ICD-10-CM

## 2022-12-22 PROCEDURE — 97530 THERAPEUTIC ACTIVITIES: CPT | Performed by: PHYSICAL THERAPIST

## 2022-12-22 PROCEDURE — 97110 THERAPEUTIC EXERCISES: CPT | Performed by: PHYSICAL THERAPIST

## 2022-12-22 PROCEDURE — 97140 MANUAL THERAPY 1/> REGIONS: CPT | Performed by: PHYSICAL THERAPIST

## 2022-12-22 NOTE — PROGRESS NOTES
Physical Therapy Daily Treatment Note      Patient: Marichuy Dinh   : 1970  Referring practitioner: Canelo Willett MD  Date of Initial Visit: Type: THERAPY  Noted: 2022  Today's Date: 2022  Patient seen for 19 sessions       Visit Diagnoses:    ICD-10-CM ICD-9-CM   1. Closed fracture of left wrist with routine healing, subsequent encounter  S62.102D V54.19   2. Closed fracture of right wrist with routine healing, subsequent encounter  S62.101D V54.19       Subjective Evaluation    History of Present Illness    Subjective comment: PT has no complaint today.       Objective   See Exercise, Manual, and Modality Logs for complete treatment.       Assessment & Plan     Assessment    Assessment details: Tx today consisted of mh to both wrists; followed by manual stretching, there ex for improved wrist stability and mobility and function and ended with stm and scar massage to left wrist and ice post tx.  Pt demonstrated good effort yet reported mild increased right wrist fatigue due to receiving tx yesterday. Pt reported 2/10 post pain.    Plan  Plan details: Will follow progressing wrist stability and and increased mobility.          Timed:         Manual Therapy:    12     mins  70661;     Therapeutic Exercise:    31    mins  81618;     Neuromuscular Leilani:        mins  32241;    Therapeutic Activity:     10     mins  99810;     Gait Training:           mins  79980;     Ultrasound:          mins  90524;    Ionto                                   mins   59219  Self Care                            mins   04567  Canalith Repos         mins 88883      Un-Timed:  Electrical Stimulation:         mins  82106 ( );  Dry Needling          mins self-pay  Traction          mins 23703      Timed Treatment:   53   mins   Total Treatment:     64   Mins(6min mh and 5min ice)    David Wood PT  [place] License: [id number]      Electronically signed by David Wood PT, 22, 1:10 PM EST

## 2022-12-28 ENCOUNTER — TREATMENT (OUTPATIENT)
Dept: PHYSICAL THERAPY | Facility: CLINIC | Age: 52
End: 2022-12-28

## 2022-12-28 DIAGNOSIS — S62.102D CLOSED FRACTURE OF LEFT WRIST WITH ROUTINE HEALING, SUBSEQUENT ENCOUNTER: Primary | ICD-10-CM

## 2022-12-28 DIAGNOSIS — S62.101D CLOSED FRACTURE OF RIGHT WRIST WITH ROUTINE HEALING, SUBSEQUENT ENCOUNTER: ICD-10-CM

## 2022-12-28 PROCEDURE — 97530 THERAPEUTIC ACTIVITIES: CPT | Performed by: PHYSICAL THERAPIST

## 2022-12-28 PROCEDURE — 97110 THERAPEUTIC EXERCISES: CPT | Performed by: PHYSICAL THERAPIST

## 2022-12-28 PROCEDURE — 97140 MANUAL THERAPY 1/> REGIONS: CPT | Performed by: PHYSICAL THERAPIST

## 2022-12-28 NOTE — PROGRESS NOTES
Physical Therapy Daily Treatment Note      Patient: Marichuy Dinh   : 1970  Referring practitioner: Canelo Willett MD  Date of Initial Visit: Type: THERAPY  Noted: 2022  Today's Date: 2022  Patient seen for 20 sessions       Visit Diagnoses:    ICD-10-CM ICD-9-CM   1. Closed fracture of left wrist with routine healing, subsequent encounter  S62.102D V54.19   2. Closed fracture of right wrist with routine healing, subsequent encounter  S62.101D V54.19       Subjective Evaluation    History of Present Illness    Subjective comment: Pt reports she will be ready for discharge this Friday.       Objective   See Exercise, Manual, and Modality Logs for complete treatment.       Assessment & Plan     Assessment    Assessment details: Tx today consisted of mh to both wrists followed by manual stretching; there ex and review or HEP and DC planning and ended with stm and scar massage to left wrist and ice.  Pt demonstrated good effort and reported no pain following session.    Plan  Plan details: Will discharge this Friday with HEP.          Timed:         Manual Therapy:    12     mins  99820;     Therapeutic Exercise:    34     mins  21754;     Neuromuscular Leilani:        mins  09688;    Therapeutic Activity:     14     mins  59446;     Gait Training:           mins  46423;     Ultrasound:          mins  80749;    Ionto                                   mins   47559  Self Care                            mins   91244  Canalith Repos         mins 56687      Un-Timed:  Electrical Stimulation:         mins  87494 ( );  Dry Needling          mins self-pay  Traction          mins 15197      Timed Treatment:   60   mins   Total Treatment:     71   Mins(6 min mh and 5 min ice)    David Wood PT  [place] License: [id number]      Electronically signed by David Wood PT, 22, 3:23 PM EST

## 2022-12-30 ENCOUNTER — TREATMENT (OUTPATIENT)
Dept: PHYSICAL THERAPY | Facility: CLINIC | Age: 52
End: 2022-12-30

## 2022-12-30 DIAGNOSIS — S62.101D CLOSED FRACTURE OF RIGHT WRIST WITH ROUTINE HEALING, SUBSEQUENT ENCOUNTER: ICD-10-CM

## 2022-12-30 DIAGNOSIS — S62.102D CLOSED FRACTURE OF LEFT WRIST WITH ROUTINE HEALING, SUBSEQUENT ENCOUNTER: Primary | ICD-10-CM

## 2022-12-30 PROCEDURE — 97140 MANUAL THERAPY 1/> REGIONS: CPT | Performed by: PHYSICAL THERAPIST

## 2022-12-30 PROCEDURE — 97530 THERAPEUTIC ACTIVITIES: CPT | Performed by: PHYSICAL THERAPIST

## 2022-12-30 PROCEDURE — 97110 THERAPEUTIC EXERCISES: CPT | Performed by: PHYSICAL THERAPIST

## 2022-12-30 NOTE — PROGRESS NOTES
Physical Therapy Daily Treatment Note/ Discharge      Patient: Marichuy Dinh   : 1970  Referring practitioner: Canelo Willett MD  Date of Initial Visit: Type: THERAPY  Noted: 2022  Today's Date: 2022  Patient seen for 21 sessions       Visit Diagnoses:    ICD-10-CM ICD-9-CM   1. Closed fracture of left wrist with routine healing, subsequent encounter  S62.102D V54.19   2. Closed fracture of right wrist with routine healing, subsequent encounter  S62.101D V54.19       Subjective Evaluation    History of Present Illness    Subjective comment: Pt reports being ready for discharge today.       Objective          Passive Range of Motion     Left Wrist   Wrist flexion: 62 degrees   Wrist extension: 65 degrees     Right Wrist   Wrist flexion: 63 degrees   Wrist extension: 58 degrees     Strength/Myotome Testing     Left Wrist/Hand      (2nd hand position)     Trial 1: 30 lbs    Trial 2: 30 lbs    Trial 3: 30 lbs    Average: 30 lbs    Right Wrist/Hand      (2nd hand position)     Trial 1: 30 lbs    Trial 2: 30 lbs    Trial 3: 30 lbs    Average: 30 lbs      See Exercise, Manual, and Modality Logs for complete treatment.       Assessment & Plan     Assessment    Assessment details: Tx today consisted of mh to bilateral wrists; manual stretching of wrists; DC education and HEP review and demonstration and ended with stm to left wrist and scar and ended with ice.  Pt demonstrated good understanding of HEP and was noted to have good gains with ROM and strength.    Plan  Plan details: Will discharge with HEP.  Pt instructed to contact therapy as needed.          Timed:         Manual Therapy:    12     mins  60845;     Therapeutic Exercise:    29     mins  54817;     Neuromuscular Leilani:        mins  00492;    Therapeutic Activity:     15     mins  85483;     Gait Training:           mins  91635;     Ultrasound:          mins  65762;    Ionto                                   mins   63214  Self Care                             mins   18396  [facility]         mins 12886      Un-Timed:  Electrical Stimulation:         mins  53607 ( );  Dry Needling          mins self-pay  Traction          mins 93496      Timed Treatment:   56   mins   Total Treatment:     67   Mins(6 min mh and 5 min ice)    David Wood PT  [place] License: [id number]      Electronically signed by David Wood PT, 12/30/22, 1:09 PM EST

## 2023-09-01 ENCOUNTER — TRANSCRIBE ORDERS (OUTPATIENT)
Dept: ADMINISTRATIVE | Facility: HOSPITAL | Age: 53
End: 2023-09-01
Payer: COMMERCIAL

## 2023-09-01 DIAGNOSIS — Z12.31 VISIT FOR SCREENING MAMMOGRAM: Primary | ICD-10-CM

## 2023-09-14 ENCOUNTER — HOSPITAL ENCOUNTER (OUTPATIENT)
Dept: MAMMOGRAPHY | Facility: HOSPITAL | Age: 53
Discharge: HOME | End: 2023-09-14
Admitting: INTERNAL MEDICINE
Payer: COMMERCIAL

## 2023-09-14 DIAGNOSIS — Z12.31 VISIT FOR SCREENING MAMMOGRAM: ICD-10-CM

## 2023-09-14 PROCEDURE — 77067 SCR MAMMO BI INCL CAD: CPT

## 2023-09-14 PROCEDURE — 77063 BREAST TOMOSYNTHESIS BI: CPT

## 2023-09-21 ENCOUNTER — TRANSCRIBE ORDERS (OUTPATIENT)
Dept: ADMINISTRATIVE | Facility: HOSPITAL | Age: 53
End: 2023-09-21

## 2023-09-21 DIAGNOSIS — R93.7 MUSCULOSKELETAL SYSTEM IMAGING ABNORMALITY: Primary | ICD-10-CM

## 2023-09-27 ENCOUNTER — HOSPITAL ENCOUNTER (OUTPATIENT)
Dept: CT IMAGING | Facility: HOSPITAL | Age: 53
Discharge: HOME | End: 2023-09-27
Admitting: PSYCHIATRY & NEUROLOGY
Payer: COMMERCIAL

## 2023-09-27 DIAGNOSIS — R93.7 MUSCULOSKELETAL SYSTEM IMAGING ABNORMALITY: ICD-10-CM

## 2023-09-27 PROCEDURE — 72131 CT LUMBAR SPINE W/O DYE: CPT

## 2025-04-07 ENCOUNTER — TRANSCRIBE ORDERS (OUTPATIENT)
Dept: ADMINISTRATIVE | Facility: HOSPITAL | Age: 55
End: 2025-04-07
Payer: COMMERCIAL

## 2025-04-07 ENCOUNTER — HOSPITAL ENCOUNTER (OUTPATIENT)
Facility: HOSPITAL | Age: 55
Discharge: HOME | End: 2025-04-07
Admitting: INTERNAL MEDICINE
Payer: COMMERCIAL

## 2025-04-07 DIAGNOSIS — E03.5 HYPOTHYROID COMA: ICD-10-CM

## 2025-04-07 DIAGNOSIS — E04.1 NONTOXIC UNINODULAR GOITER: ICD-10-CM

## 2025-04-07 DIAGNOSIS — E78.41 ELEVATED LIPOPROTEIN A LEVEL: Primary | ICD-10-CM

## 2025-04-07 DIAGNOSIS — E55.9 VITAMIN D DEFICIENCY: ICD-10-CM

## 2025-04-07 DIAGNOSIS — E78.41 ELEVATED LIPOPROTEIN A LEVEL: ICD-10-CM

## 2025-04-07 LAB
25(OH)D3 SERPL-MCNC: 27.7 NG/ML (ref 30–100)
ALBUMIN SERPL-MCNC: 4.6 G/DL (ref 3.5–5.2)
ALBUMIN/GLOB SERPL: 1.9 G/DL
ALP SERPL-CCNC: 93 U/L (ref 39–117)
ALT SERPL W P-5'-P-CCNC: 30 U/L (ref 1–33)
ANION GAP SERPL CALCULATED.3IONS-SCNC: 10.2 MMOL/L (ref 5–15)
AST SERPL-CCNC: 24 U/L (ref 1–32)
BASOPHILS # BLD AUTO: 0.03 10*3/MM3 (ref 0–0.2)
BASOPHILS NFR BLD AUTO: 0.6 % (ref 0–1.5)
BILIRUB SERPL-MCNC: 0.6 MG/DL (ref 0–1.2)
BUN SERPL-MCNC: 11 MG/DL (ref 6–20)
BUN/CREAT SERPL: 12.8 (ref 7–25)
CALCIUM SPEC-SCNC: 9.4 MG/DL (ref 8.6–10.5)
CHLORIDE SERPL-SCNC: 105 MMOL/L (ref 98–107)
CO2 SERPL-SCNC: 25.8 MMOL/L (ref 22–29)
CREAT SERPL-MCNC: 0.86 MG/DL (ref 0.57–1)
DEPRECATED RDW RBC AUTO: 40.5 FL (ref 37–54)
EGFRCR SERPLBLD CKD-EPI 2021: 80.4 ML/MIN/1.73
EOSINOPHIL # BLD AUTO: 0.08 10*3/MM3 (ref 0–0.4)
EOSINOPHIL NFR BLD AUTO: 1.5 % (ref 0.3–6.2)
ERYTHROCYTE [DISTWIDTH] IN BLOOD BY AUTOMATED COUNT: 12.5 % (ref 12.3–15.4)
GLOBULIN UR ELPH-MCNC: 2.4 GM/DL
GLUCOSE SERPL-MCNC: 96 MG/DL (ref 65–99)
HCT VFR BLD AUTO: 44.5 % (ref 34–46.6)
HGB BLD-MCNC: 14.2 G/DL (ref 12–15.9)
IMM GRANULOCYTES # BLD AUTO: 0.01 10*3/MM3 (ref 0–0.05)
IMM GRANULOCYTES NFR BLD AUTO: 0.2 % (ref 0–0.5)
LYMPHOCYTES # BLD AUTO: 1.52 10*3/MM3 (ref 0.7–3.1)
LYMPHOCYTES NFR BLD AUTO: 29.3 % (ref 19.6–45.3)
MAGNESIUM SERPL-MCNC: 2.4 MG/DL (ref 1.6–2.6)
MCH RBC QN AUTO: 28.3 PG (ref 26.6–33)
MCHC RBC AUTO-ENTMCNC: 31.9 G/DL (ref 31.5–35.7)
MCV RBC AUTO: 88.8 FL (ref 79–97)
MONOCYTES # BLD AUTO: 0.41 10*3/MM3 (ref 0.1–0.9)
MONOCYTES NFR BLD AUTO: 7.9 % (ref 5–12)
NEUTROPHILS NFR BLD AUTO: 3.14 10*3/MM3 (ref 1.7–7)
NEUTROPHILS NFR BLD AUTO: 60.5 % (ref 42.7–76)
NRBC BLD AUTO-RTO: 0 /100 WBC (ref 0–0.2)
PLATELET # BLD AUTO: 224 10*3/MM3 (ref 140–450)
PMV BLD AUTO: 10.4 FL (ref 6–12)
POTASSIUM SERPL-SCNC: 4.2 MMOL/L (ref 3.5–5.2)
PROT SERPL-MCNC: 7 G/DL (ref 6–8.5)
RBC # BLD AUTO: 5.01 10*6/MM3 (ref 3.77–5.28)
SODIUM SERPL-SCNC: 141 MMOL/L (ref 136–145)
T4 FREE SERPL-MCNC: 1.08 NG/DL (ref 0.92–1.68)
TSH SERPL DL<=0.05 MIU/L-ACNC: 3.51 UIU/ML (ref 0.27–4.2)
WBC NRBC COR # BLD AUTO: 5.19 10*3/MM3 (ref 3.4–10.8)

## 2025-04-07 PROCEDURE — 82306 VITAMIN D 25 HYDROXY: CPT | Performed by: INTERNAL MEDICINE

## 2025-04-07 PROCEDURE — 36415 COLL VENOUS BLD VENIPUNCTURE: CPT | Performed by: INTERNAL MEDICINE

## 2025-04-07 PROCEDURE — 84439 ASSAY OF FREE THYROXINE: CPT | Performed by: INTERNAL MEDICINE

## 2025-04-07 PROCEDURE — 83735 ASSAY OF MAGNESIUM: CPT | Performed by: INTERNAL MEDICINE

## 2025-04-07 PROCEDURE — 80050 GENERAL HEALTH PANEL: CPT | Performed by: INTERNAL MEDICINE

## (undated) DEVICE — BNDG ELAS CO-FLEX SLF ADHR 4IN5YD LF STRL

## (undated) DEVICE — SUT MNCRYL 3/0 Y936H

## (undated) DEVICE — ANTIBACTERIAL UNDYED BRAIDED (POLYGLACTIN 910), SYNTHETIC ABSORBABLE SUTURE: Brand: COATED VICRYL

## (undated) DEVICE — PK EXTREM UPPR 70

## (undated) DEVICE — DRSNG PAD ABD 8X10IN STRL

## (undated) DEVICE — TP CAST SCOTCHCAST PLS 2IN 4YD WHT

## (undated) DEVICE — TP UMB COTN 1/8X36 U12T

## (undated) DEVICE — GLV SURG SENSICARE PI ORTHO SZ7.5 LF STRL

## (undated) DEVICE — DISPOSABLE TOURNIQUET CUFF SINGLE BLADDER, SINGLE PORT AND LUER LOCK CONNECTOR: Brand: COLOR CUFF

## (undated) DEVICE — BIT DRL QC DIA W/DEPTHMARK 1.8X110MM

## (undated) DEVICE — Device

## (undated) DEVICE — SHOULDER IMMOBILIZER: Brand: DEROYAL

## (undated) DEVICE — HOLDER: Brand: DEROYAL